# Patient Record
Sex: MALE | Race: WHITE | Employment: FULL TIME | URBAN - NONMETROPOLITAN AREA
[De-identification: names, ages, dates, MRNs, and addresses within clinical notes are randomized per-mention and may not be internally consistent; named-entity substitution may affect disease eponyms.]

---

## 2019-05-13 RX ORDER — ALBUTEROL SULFATE 90 UG/1
2 AEROSOL, METERED RESPIRATORY (INHALATION) EVERY 4 HOURS PRN
Qty: 1 INHALER | Refills: 1 | Status: SHIPPED | OUTPATIENT
Start: 2019-05-13 | End: 2019-06-10 | Stop reason: SDUPTHER

## 2019-05-13 RX ORDER — ALBUTEROL SULFATE 90 UG/1
AEROSOL, METERED RESPIRATORY (INHALATION)
COMMUNITY
Start: 2016-04-23 | End: 2019-05-13 | Stop reason: SDUPTHER

## 2019-06-05 RX ORDER — AMLODIPINE BESYLATE 10 MG/1
10 TABLET ORAL DAILY
COMMUNITY
End: 2019-06-10 | Stop reason: SDUPTHER

## 2019-06-05 RX ORDER — MONTELUKAST SODIUM 10 MG/1
10 TABLET ORAL NIGHTLY
COMMUNITY
End: 2019-06-10 | Stop reason: SDUPTHER

## 2019-06-05 RX ORDER — PREDNISONE 10 MG/1
TABLET ORAL
COMMUNITY
Start: 2017-01-07 | End: 2019-06-08

## 2019-06-05 SDOH — HEALTH STABILITY: MENTAL HEALTH: HOW OFTEN DO YOU HAVE A DRINK CONTAINING ALCOHOL?: NEVER

## 2019-06-10 ENCOUNTER — OFFICE VISIT (OUTPATIENT)
Dept: FAMILY MEDICINE CLINIC | Age: 64
End: 2019-06-10
Payer: COMMERCIAL

## 2019-06-10 VITALS
HEART RATE: 68 BPM | SYSTOLIC BLOOD PRESSURE: 130 MMHG | WEIGHT: 175 LBS | HEIGHT: 67 IN | BODY MASS INDEX: 27.47 KG/M2 | RESPIRATION RATE: 16 BRPM | DIASTOLIC BLOOD PRESSURE: 74 MMHG

## 2019-06-10 DIAGNOSIS — J45.909 UNCOMPLICATED ASTHMA, UNSPECIFIED ASTHMA SEVERITY, UNSPECIFIED WHETHER PERSISTENT: Primary | ICD-10-CM

## 2019-06-10 DIAGNOSIS — I10 ESSENTIAL HYPERTENSION: ICD-10-CM

## 2019-06-10 DIAGNOSIS — E78.5 HYPERLIPIDEMIA, UNSPECIFIED HYPERLIPIDEMIA TYPE: ICD-10-CM

## 2019-06-10 DIAGNOSIS — T78.40XA ALLERGIC STATE, INITIAL ENCOUNTER: ICD-10-CM

## 2019-06-10 DIAGNOSIS — Z12.5 PROSTATE CANCER SCREENING: ICD-10-CM

## 2019-06-10 DIAGNOSIS — Z12.12 ENCOUNTER FOR SCREENING FOR MALIGNANT NEOPLASM OF RECTUM: ICD-10-CM

## 2019-06-10 PROCEDURE — 93000 ELECTROCARDIOGRAM COMPLETE: CPT | Performed by: FAMILY MEDICINE

## 2019-06-10 PROCEDURE — 99214 OFFICE O/P EST MOD 30 MIN: CPT | Performed by: FAMILY MEDICINE

## 2019-06-10 RX ORDER — ALBUTEROL SULFATE 90 UG/1
2 AEROSOL, METERED RESPIRATORY (INHALATION) EVERY 4 HOURS PRN
Qty: 3 INHALER | Refills: 1 | Status: ON HOLD | OUTPATIENT
Start: 2019-06-10 | End: 2019-09-26

## 2019-06-10 RX ORDER — AMLODIPINE BESYLATE 10 MG/1
10 TABLET ORAL DAILY
Qty: 90 TABLET | Refills: 1 | Status: SHIPPED | OUTPATIENT
Start: 2019-06-10 | End: 2019-12-23 | Stop reason: SDUPTHER

## 2019-06-10 RX ORDER — MONTELUKAST SODIUM 10 MG/1
10 TABLET ORAL NIGHTLY
Qty: 90 TABLET | Refills: 1 | Status: SHIPPED | OUTPATIENT
Start: 2019-06-10 | End: 2019-12-13 | Stop reason: SDUPTHER

## 2019-06-10 ASSESSMENT — ENCOUNTER SYMPTOMS
ABDOMINAL PAIN: 0
SHORTNESS OF BREATH: 1
CHEST TIGHTNESS: 0
ALLERGIC/IMMUNOLOGIC NEGATIVE: 1

## 2019-06-10 ASSESSMENT — PATIENT HEALTH QUESTIONNAIRE - PHQ9
SUM OF ALL RESPONSES TO PHQ9 QUESTIONS 1 & 2: 0
SUM OF ALL RESPONSES TO PHQ QUESTIONS 1-9: 0
1. LITTLE INTEREST OR PLEASURE IN DOING THINGS: 0
SUM OF ALL RESPONSES TO PHQ QUESTIONS 1-9: 0
2. FEELING DOWN, DEPRESSED OR HOPELESS: 0

## 2019-06-10 NOTE — PROGRESS NOTES
6/10/19  Adela Parada : 1955 Sex: male  Age: 62 y.o. Chief Complaint   Patient presents with    Hypertension    Asthma       Follow up on hypertension. Breathing has been stable   Patient presents for recheck of hypertension. Denies chest pain, edema, fatigue, palpitations and syncope. Compliance reviewed. No medication side effects noted. Denies associated dyspnea, edema, fatigue, orthopnea, chest pain and palpitations. Review of Systems   Constitutional: Negative for activity change and appetite change. HENT: Negative for congestion. Eyes: Negative for visual disturbance. Respiratory: Positive for shortness of breath. Negative for chest tightness. Cardiovascular: Negative for chest pain, palpitations and leg swelling. Gastrointestinal: Negative for abdominal pain. Endocrine: Negative for cold intolerance and heat intolerance. Genitourinary: Negative for difficulty urinating. Musculoskeletal: Negative for arthralgias. Skin: Negative. Negative for rash. Allergic/Immunologic: Negative. Neurological: Negative for dizziness, syncope and light-headedness. Hematological: Negative. Negative for adenopathy. Psychiatric/Behavioral: Negative.           Current Outpatient Medications:     albuterol sulfate HFA (VENTOLIN HFA) 108 (90 Base) MCG/ACT inhaler, Inhale 2 puffs into the lungs every 4 hours as needed for Wheezing, Disp: 3 Inhaler, Rfl: 1    montelukast (SINGULAIR) 10 MG tablet, Take 1 tablet by mouth nightly, Disp: 90 tablet, Rfl: 1    fluticasone-salmeterol (ADVAIR DISKUS) 500-50 MCG/DOSE diskus inhaler, Inhale 1 puff into the lungs 2 times daily, Disp: 3 Inhaler, Rfl: 1    amLODIPine (NORVASC) 10 MG tablet, Take 1 tablet by mouth daily, Disp: 90 tablet, Rfl: 1  Allergies   Allergen Reactions    Salmeterol     Sulfa Antibiotics        Past Medical History:   Diagnosis Date    Asthma      Past Surgical History:   Procedure Laterality Date    TONSILLECTOMY clear and moist.   Eyes: Pupils are equal, round, and reactive to light. Conjunctivae and EOM are normal.   Neck: Normal range of motion. Neck supple. No thyromegaly present. Cardiovascular: Normal rate, regular rhythm, normal heart sounds and intact distal pulses. Pulmonary/Chest: Effort normal and breath sounds normal.   Abdominal: Soft. Bowel sounds are normal. He exhibits no mass. There is no tenderness. Genitourinary: Rectum normal and prostate normal.   Musculoskeletal: Normal range of motion. Lymphadenopathy:     He has no cervical adenopathy. Neurological: He is alert and oriented to person, place, and time. No cranial nerve deficit. Skin: Skin is warm and dry. No rash noted. Psychiatric: He has a normal mood and affect. Assessment and Plan:  Mary Be was seen today for hypertension and asthma. Diagnoses and all orders for this visit:    Uncomplicated asthma, unspecified asthma severity, unspecified whether persistent  Comments:  breathing has been stable  Orders:  -     albuterol sulfate HFA (VENTOLIN HFA) 108 (90 Base) MCG/ACT inhaler; Inhale 2 puffs into the lungs every 4 hours as needed for Wheezing  -     fluticasone-salmeterol (ADVAIR DISKUS) 500-50 MCG/DOSE diskus inhaler; Inhale 1 puff into the lungs 2 times daily    Allergic state, initial encounter  -     montelukast (SINGULAIR) 10 MG tablet; Take 1 tablet by mouth nightly    Essential hypertension  Comments:  stable   declines ekg   Orders:  -     amLODIPine (NORVASC) 10 MG tablet; Take 1 tablet by mouth daily  -     CBC Auto Differential; Future  -     Comprehensive Metabolic Panel; Future  -     EKG 12 Lead    Encounter for screening for malignant neoplasm of rectum  -     POCT Fit Test; Future    Prostate cancer screening  -     Psa screening; Future    Hyperlipidemia, unspecified hyperlipidemia type  Comments:  due for fasting yearly labs   Orders:  -     Lipid Panel;  Future        Return in about 6 months (around

## 2019-08-16 ENCOUNTER — OFFICE VISIT (OUTPATIENT)
Dept: FAMILY MEDICINE CLINIC | Age: 64
End: 2019-08-16
Payer: COMMERCIAL

## 2019-08-16 VITALS
BODY MASS INDEX: 27.31 KG/M2 | SYSTOLIC BLOOD PRESSURE: 132 MMHG | HEIGHT: 67 IN | WEIGHT: 174 LBS | DIASTOLIC BLOOD PRESSURE: 60 MMHG | HEART RATE: 96 BPM

## 2019-08-16 DIAGNOSIS — R93.89 CHEST X-RAY ABNORMALITY: Primary | ICD-10-CM

## 2019-08-16 DIAGNOSIS — J45.41 MODERATE PERSISTENT ASTHMA WITH ACUTE EXACERBATION: ICD-10-CM

## 2019-08-16 PROBLEM — J45.909 ASTHMA: Status: ACTIVE | Noted: 2019-08-16

## 2019-08-16 PROCEDURE — 99213 OFFICE O/P EST LOW 20 MIN: CPT | Performed by: FAMILY MEDICINE

## 2019-08-16 RX ORDER — AZITHROMYCIN 250 MG/1
250 TABLET, FILM COATED ORAL SEE ADMIN INSTRUCTIONS
Qty: 6 TABLET | Refills: 0 | Status: SHIPPED | OUTPATIENT
Start: 2019-08-16 | End: 2019-08-21

## 2019-08-16 RX ORDER — METHYLPREDNISOLONE 4 MG/1
TABLET ORAL
Qty: 1 KIT | Refills: 0 | Status: SHIPPED | OUTPATIENT
Start: 2019-08-16 | End: 2019-08-22

## 2019-08-16 RX ORDER — PREDNISONE 10 MG/1
1 TABLET ORAL DAILY PRN
COMMUNITY
Start: 2017-01-07 | End: 2019-08-29

## 2019-08-16 NOTE — PROGRESS NOTES
OFFICE NOTE    19  Name: Jacinda Hagen  :1955   Sex:male   Age:64 y.o. SUBJECTIVE  Chief Complaint   Patient presents with   César Linda     started yesterday        HPI comes in reporting pleurisy on right anterolateral chest for about  A week. Denies MICHAELS, but hurts to breath deeply. H/o asthma which is fairly severe, and cough occasioanlly productive, no hemoptysis        Review of Systems   Denies unusual weight loss. Enlarged lymph nodes, neurological symptoms.  No fever or night sweats      Current Outpatient Medications:     predniSONE (DELTASONE) 10 MG tablet, Take 1 tablet by mouth daily as needed, Disp: , Rfl:     methylPREDNISolone (MEDROL DOSEPACK) 4 MG tablet, Take by mouth., Disp: 1 kit, Rfl: 0    azithromycin (ZITHROMAX) 250 MG tablet, Take 1 tablet by mouth See Admin Instructions for 5 days 500mg on day 1 followed by 250mg on days 2 - 5, Disp: 6 tablet, Rfl: 0    albuterol sulfate HFA (VENTOLIN HFA) 108 (90 Base) MCG/ACT inhaler, Inhale 2 puffs into the lungs every 4 hours as needed for Wheezing, Disp: 3 Inhaler, Rfl: 1    montelukast (SINGULAIR) 10 MG tablet, Take 1 tablet by mouth nightly, Disp: 90 tablet, Rfl: 1    fluticasone-salmeterol (ADVAIR DISKUS) 500-50 MCG/DOSE diskus inhaler, Inhale 1 puff into the lungs 2 times daily, Disp: 3 Inhaler, Rfl: 1    amLODIPine (NORVASC) 10 MG tablet, Take 1 tablet by mouth daily, Disp: 90 tablet, Rfl: 1  Allergies   Allergen Reactions    Salmeterol Other (See Comments)     Lung issues     Sulfa Antibiotics Other (See Comments)     Reaction as a child        Past Medical History:   Diagnosis Date    Asthma      Past Surgical History:   Procedure Laterality Date    TONSILLECTOMY AND ADENOIDECTOMY      WRIST SURGERY       Family History   Problem Relation Age of Onset    High Blood Pressure Mother     No Known Problems Father      Social History     Tobacco History     Smoking Status  Never Smoker    Smokeless Tobacco Use  Never

## 2019-08-29 ENCOUNTER — OFFICE VISIT (OUTPATIENT)
Dept: FAMILY MEDICINE CLINIC | Age: 64
End: 2019-08-29
Payer: COMMERCIAL

## 2019-08-29 VITALS
WEIGHT: 174 LBS | SYSTOLIC BLOOD PRESSURE: 100 MMHG | TEMPERATURE: 100.8 F | BODY MASS INDEX: 27.31 KG/M2 | OXYGEN SATURATION: 94 % | HEIGHT: 67 IN | HEART RATE: 102 BPM | DIASTOLIC BLOOD PRESSURE: 56 MMHG | RESPIRATION RATE: 15 BRPM

## 2019-08-29 DIAGNOSIS — L50.0 ALLERGIC URTICARIA: Primary | ICD-10-CM

## 2019-08-29 DIAGNOSIS — J45.20 MILD INTERMITTENT ASTHMA WITHOUT COMPLICATION: ICD-10-CM

## 2019-08-29 PROCEDURE — 96372 THER/PROPH/DIAG INJ SC/IM: CPT | Performed by: FAMILY MEDICINE

## 2019-08-29 PROCEDURE — 99213 OFFICE O/P EST LOW 20 MIN: CPT | Performed by: FAMILY MEDICINE

## 2019-08-29 RX ORDER — PREDNISONE 10 MG/1
TABLET ORAL
Qty: 18 TABLET | Refills: 0 | Status: SHIPPED | OUTPATIENT
Start: 2019-08-29 | End: 2019-09-07

## 2019-08-29 RX ORDER — METHYLPREDNISOLONE ACETATE 40 MG/ML
40 INJECTION, SUSPENSION INTRA-ARTICULAR; INTRALESIONAL; INTRAMUSCULAR; SOFT TISSUE ONCE
Status: COMPLETED | OUTPATIENT
Start: 2019-08-29 | End: 2019-08-29

## 2019-08-29 RX ADMIN — METHYLPREDNISOLONE ACETATE 40 MG: 40 INJECTION, SUSPENSION INTRA-ARTICULAR; INTRALESIONAL; INTRAMUSCULAR; SOFT TISSUE at 09:38

## 2019-08-29 ASSESSMENT — ENCOUNTER SYMPTOMS
SINUS PAIN: 0
WHEEZING: 1
COUGH: 0
DIARRHEA: 0
SORE THROAT: 0
EYE DISCHARGE: 0
COLOR CHANGE: 1
EYE PAIN: 0
SHORTNESS OF BREATH: 0
TROUBLE SWALLOWING: 0
BACK PAIN: 0
VOMITING: 0
EYE REDNESS: 0
ALLERGIC/IMMUNOLOGIC NEGATIVE: 1
NAUSEA: 0
ABDOMINAL PAIN: 0
CHEST TIGHTNESS: 0
BLOOD IN STOOL: 0
PHOTOPHOBIA: 0

## 2019-08-29 NOTE — PROGRESS NOTES
amLODIPine (NORVASC) 10 MG tablet, Take 1 tablet by mouth daily, Disp: 90 tablet, Rfl: 1  Allergies   Allergen Reactions    Salmeterol Other (See Comments)     Lung issues     Sulfa Antibiotics Other (See Comments)     Reaction as a child        Past Medical History:   Diagnosis Date    Asthma      Past Surgical History:   Procedure Laterality Date    TONSILLECTOMY AND ADENOIDECTOMY      WRIST SURGERY       Family History   Problem Relation Age of Onset    High Blood Pressure Mother     No Known Problems Father      Social History     Socioeconomic History    Marital status:      Spouse name: Not on file    Number of children: Not on file    Years of education: Not on file    Highest education level: Not on file   Occupational History    Not on file   Social Needs    Financial resource strain: Not on file    Food insecurity:     Worry: Not on file     Inability: Not on file    Transportation needs:     Medical: Not on file     Non-medical: Not on file   Tobacco Use    Smoking status: Never Smoker    Smokeless tobacco: Never Used   Substance and Sexual Activity    Alcohol use: Never     Frequency: Never    Drug use: Not on file    Sexual activity: Not on file   Lifestyle    Physical activity:     Days per week: Not on file     Minutes per session: Not on file    Stress: Not on file   Relationships    Social connections:     Talks on phone: Not on file     Gets together: Not on file     Attends Alevism service: Not on file     Active member of club or organization: Not on file     Attends meetings of clubs or organizations: Not on file     Relationship status: Not on file    Intimate partner violence:     Fear of current or ex partner: Not on file     Emotionally abused: Not on file     Physically abused: Not on file     Forced sexual activity: Not on file   Other Topics Concern    Not on file   Social History Narrative    Not on file       Vitals:    08/29/19 0917   BP: (!) 100/56

## 2019-08-31 ENCOUNTER — HOSPITAL ENCOUNTER (OUTPATIENT)
Age: 64
Discharge: HOME | End: 2019-09-02
Payer: COMMERCIAL

## 2019-08-31 ENCOUNTER — OFFICE VISIT (OUTPATIENT)
Dept: FAMILY MEDICINE CLINIC | Age: 64
End: 2019-08-31
Payer: COMMERCIAL

## 2019-08-31 VITALS
WEIGHT: 176 LBS | TEMPERATURE: 97.8 F | OXYGEN SATURATION: 98 % | SYSTOLIC BLOOD PRESSURE: 114 MMHG | DIASTOLIC BLOOD PRESSURE: 60 MMHG | BODY MASS INDEX: 27.57 KG/M2 | HEART RATE: 76 BPM

## 2019-08-31 DIAGNOSIS — L50.0 ALLERGIC URTICARIA: Primary | ICD-10-CM

## 2019-08-31 DIAGNOSIS — L50.0 ALLERGIC URTICARIA: ICD-10-CM

## 2019-08-31 LAB
HCT VFR BLD CALC: 41.1 % (ref 37–54)
HEMOGLOBIN: 12.8 G/DL (ref 12.5–16.5)
MCH RBC QN AUTO: 29.2 PG (ref 26–35)
MCHC RBC AUTO-ENTMCNC: 31.1 % (ref 32–34.5)
MCV RBC AUTO: 93.6 FL (ref 80–99.9)
PDW BLD-RTO: 13.3 FL (ref 11.5–15)
PLATELET # BLD: 384 E9/L (ref 130–450)
PMV BLD AUTO: 9.7 FL (ref 7–12)
RBC # BLD: 4.39 E12/L (ref 3.8–5.8)
WBC # BLD: 20.1 E9/L (ref 4.5–11.5)

## 2019-08-31 PROCEDURE — 99213 OFFICE O/P EST LOW 20 MIN: CPT | Performed by: FAMILY MEDICINE

## 2019-08-31 PROCEDURE — 96372 THER/PROPH/DIAG INJ SC/IM: CPT | Performed by: FAMILY MEDICINE

## 2019-08-31 PROCEDURE — 85027 COMPLETE CBC AUTOMATED: CPT

## 2019-08-31 PROCEDURE — 36415 COLL VENOUS BLD VENIPUNCTURE: CPT

## 2019-08-31 RX ORDER — METHYLPREDNISOLONE ACETATE 40 MG/ML
40 INJECTION, SUSPENSION INTRA-ARTICULAR; INTRALESIONAL; INTRAMUSCULAR; SOFT TISSUE ONCE
Status: COMPLETED | OUTPATIENT
Start: 2019-08-31 | End: 2019-08-31

## 2019-08-31 RX ADMIN — METHYLPREDNISOLONE ACETATE 40 MG: 40 INJECTION, SUSPENSION INTRA-ARTICULAR; INTRALESIONAL; INTRAMUSCULAR; SOFT TISSUE at 09:07

## 2019-08-31 ASSESSMENT — ENCOUNTER SYMPTOMS
SINUS PAIN: 0
WHEEZING: 1
COUGH: 0
CHEST TIGHTNESS: 0
ALLERGIC/IMMUNOLOGIC NEGATIVE: 1
COLOR CHANGE: 1
BLOOD IN STOOL: 0
DIARRHEA: 0
VOMITING: 0
NAUSEA: 0
SHORTNESS OF BREATH: 0
TROUBLE SWALLOWING: 0
SORE THROAT: 0
ABDOMINAL PAIN: 0
EYE REDNESS: 0
PHOTOPHOBIA: 0
BACK PAIN: 0
EYE DISCHARGE: 0
EYE PAIN: 0

## 2019-08-31 NOTE — PROGRESS NOTES
19  Urvashi Noland : 1955 Sex: male  Age: 62 y.o. Chief Complaint   Patient presents with    Rash     was seen on thurs for same thing       Rash, hive like, onset Monday, progressive over last 4 days. Intensely pruritic, trunk and extremities. Seen EXP , Depo Medrol IM, prednisone wit hcleqrin rash upper. Legs continue and worsening with redness, pruritus    Rash   Pertinent negatives include no congestion, cough, diarrhea, eye pain, fatigue, shortness of breath, sore throat or vomiting. Review of Systems   Constitutional: Negative for appetite change, fatigue and unexpected weight change. HENT: Negative for congestion, ear pain, hearing loss, sinus pain, sore throat and trouble swallowing. Eyes: Negative for photophobia, pain, discharge and redness. Respiratory: Positive for wheezing. Negative for cough, chest tightness and shortness of breath. Cardiovascular: Negative for chest pain, palpitations and leg swelling. Gastrointestinal: Negative for abdominal pain, blood in stool, diarrhea, nausea and vomiting. Endocrine: Negative. Genitourinary: Negative for dysuria, flank pain, frequency and hematuria. Musculoskeletal: Negative for arthralgias, back pain, joint swelling and myalgias. Skin: Positive for color change and rash. Allergic/Immunologic: Negative. Neurological: Negative for dizziness, seizures, syncope, weakness, light-headedness, numbness and headaches. Hematological: Negative for adenopathy. Does not bruise/bleed easily. Psychiatric/Behavioral: Negative. Current Outpatient Medications:     predniSONE (DELTASONE) 10 MG tablet, Take 3 tablets by mouth daily for 3 days, THEN 2 tablets daily for 3 days, THEN 1 tablet daily for 3 days. , Disp: 18 tablet, Rfl: 0    albuterol sulfate HFA (VENTOLIN HFA) 108 (90 Base) MCG/ACT inhaler, Inhale 2 puffs into the lungs every 4 hours as needed for Wheezing, Disp: 3 Inhaler, Rfl: 1    montelukast Merle Velarde, DO

## 2019-09-20 ENCOUNTER — TELEPHONE (OUTPATIENT)
Dept: FAMILY MEDICINE CLINIC | Age: 64
End: 2019-09-20

## 2019-09-21 ENCOUNTER — HOSPITAL ENCOUNTER (OUTPATIENT)
Dept: CT IMAGING | Age: 64
Discharge: HOME | End: 2019-09-23
Payer: COMMERCIAL

## 2019-09-21 ENCOUNTER — HOSPITAL ENCOUNTER (OUTPATIENT)
Age: 64
Discharge: HOME | End: 2019-09-23
Payer: COMMERCIAL

## 2019-09-21 ENCOUNTER — OFFICE VISIT (OUTPATIENT)
Dept: FAMILY MEDICINE CLINIC | Age: 64
End: 2019-09-21
Payer: COMMERCIAL

## 2019-09-21 VITALS
OXYGEN SATURATION: 95 % | SYSTOLIC BLOOD PRESSURE: 128 MMHG | DIASTOLIC BLOOD PRESSURE: 64 MMHG | WEIGHT: 172.2 LBS | BODY MASS INDEX: 26.97 KG/M2 | TEMPERATURE: 98.4 F | RESPIRATION RATE: 20 BRPM | HEART RATE: 83 BPM

## 2019-09-21 DIAGNOSIS — R91.8 LUNG MASS: ICD-10-CM

## 2019-09-21 DIAGNOSIS — R07.9 CHEST PAIN, UNSPECIFIED TYPE: ICD-10-CM

## 2019-09-21 DIAGNOSIS — R07.9 CHEST PAIN, UNSPECIFIED TYPE: Primary | ICD-10-CM

## 2019-09-21 PROCEDURE — 99214 OFFICE O/P EST MOD 30 MIN: CPT | Performed by: INTERNAL MEDICINE

## 2019-09-21 PROCEDURE — 71250 CT THORAX DX C-: CPT

## 2019-09-21 RX ORDER — PREDNISONE 10 MG/1
TABLET ORAL
Qty: 12 TABLET | Refills: 0 | Status: ON HOLD | OUTPATIENT
Start: 2019-09-21 | End: 2019-09-30 | Stop reason: HOSPADM

## 2019-09-22 ASSESSMENT — ENCOUNTER SYMPTOMS
COUGH: 1
SHORTNESS OF BREATH: 0
SINUS PRESSURE: 0
NAUSEA: 0
WHEEZING: 1
VOMITING: 0
ABDOMINAL PAIN: 0
DIARRHEA: 0

## 2019-09-22 NOTE — PROGRESS NOTES
Inhale 2 puffs into the lungs every 4 hours as needed for Wheezing, Disp: 3 Inhaler, Rfl: 1    montelukast (SINGULAIR) 10 MG tablet, Take 1 tablet by mouth nightly, Disp: 90 tablet, Rfl: 1    fluticasone-salmeterol (ADVAIR DISKUS) 500-50 MCG/DOSE diskus inhaler, Inhale 1 puff into the lungs 2 times daily, Disp: 3 Inhaler, Rfl: 1    amLODIPine (NORVASC) 10 MG tablet, Take 1 tablet by mouth daily, Disp: 90 tablet, Rfl: 1  Allergies   Allergen Reactions    Salmeterol Other (See Comments)     Lung issues     Sulfa Antibiotics Other (See Comments)     Reaction as a child        Past Medical History:   Diagnosis Date    Asthma      Past Surgical History:   Procedure Laterality Date    TONSILLECTOMY AND ADENOIDECTOMY      WRIST SURGERY       Family History   Problem Relation Age of Onset    High Blood Pressure Mother     No Known Problems Father      Social History     Socioeconomic History    Marital status:      Spouse name: Not on file    Number of children: Not on file    Years of education: Not on file    Highest education level: Not on file   Occupational History    Not on file   Social Needs    Financial resource strain: Not on file    Food insecurity:     Worry: Not on file     Inability: Not on file    Transportation needs:     Medical: Not on file     Non-medical: Not on file   Tobacco Use    Smoking status: Never Smoker    Smokeless tobacco: Never Used   Substance and Sexual Activity    Alcohol use: Never     Frequency: Never    Drug use: Not on file    Sexual activity: Not on file   Lifestyle    Physical activity:     Days per week: Not on file     Minutes per session: Not on file    Stress: Not on file   Relationships    Social connections:     Talks on phone: Not on file     Gets together: Not on file     Attends Anglican service: Not on file     Active member of club or organization: Not on file     Attends meetings of clubs or organizations: Not on file     Relationship chest x-ray we will set up for CAT scan of the chest which I told him he must get. We will also get plain film rib x-rays. Follow-up with PCP next week. Taper dose prednisone given for his wheezing from his exacerbated asthma. Notify us with problems in the interim. Return 1 week fu dr [place] Taylor. Seen By:  Immanuel Eaton MD      *Document was created using voice recognition software. Note was reviewed however may contain grammatical errors.

## 2019-09-23 ENCOUNTER — TELEPHONE (OUTPATIENT)
Dept: FAMILY MEDICINE CLINIC | Age: 64
End: 2019-09-23

## 2019-09-23 ENCOUNTER — OFFICE VISIT (OUTPATIENT)
Dept: FAMILY MEDICINE CLINIC | Age: 64
End: 2019-09-23
Payer: COMMERCIAL

## 2019-09-23 VITALS
RESPIRATION RATE: 18 BRPM | TEMPERATURE: 97.8 F | BODY MASS INDEX: 26.94 KG/M2 | DIASTOLIC BLOOD PRESSURE: 66 MMHG | OXYGEN SATURATION: 93 % | HEART RATE: 78 BPM | SYSTOLIC BLOOD PRESSURE: 138 MMHG | WEIGHT: 172 LBS

## 2019-09-23 DIAGNOSIS — R91.8 MASS OF RIGHT LUNG: Primary | ICD-10-CM

## 2019-09-23 DIAGNOSIS — J90 PLEURAL EFFUSION: ICD-10-CM

## 2019-09-23 PROCEDURE — 99214 OFFICE O/P EST MOD 30 MIN: CPT | Performed by: FAMILY MEDICINE

## 2019-09-23 ASSESSMENT — ENCOUNTER SYMPTOMS
BLOOD IN STOOL: 0
COUGH: 0
SHORTNESS OF BREATH: 0
ABDOMINAL PAIN: 0

## 2019-09-23 NOTE — PROGRESS NOTES
19  Jett Dhaliwal : 1955 Sex: male  Age: 62 y.o. Chief Complaint   Patient presents with    Hypertension    Asthma     follow up from urgent care on 19    Hyperlipidemia       SUBJECTIVE    CC:    [facility] - had  abnormal xray and CT scans. Recommend referral to Pulmonology. Right lung mass  cxr says RUL   Ct saysRLUPE. Will schedule to see Dr. Yamile Meza. Was asymptomatic before recent injury --relates that pulmonary evaluated a cxr abnormality years ago. no fever --asthma stable        HPI:  Denies chest pain, edema, fatigue, palpitations and syncope. Compliance reviewed. No medication side effects noted. Review of Systems   Constitutional: Negative for appetite change, fatigue and unexpected weight change. HENT: Negative for congestion. Eyes: Negative for visual disturbance. Respiratory: Negative for cough and shortness of breath. Cardiovascular: Negative for chest pain, palpitations and leg swelling. Gastrointestinal: Negative for abdominal pain and blood in stool. Endocrine: Negative for cold intolerance, heat intolerance, polydipsia and polyuria. Genitourinary: Negative for difficulty urinating. Musculoskeletal: Negative for arthralgias and myalgias. Skin: Negative for rash. Neurological: Negative for dizziness, syncope, weakness and numbness. Hematological: Negative for adenopathy. Current Outpatient Medications:     predniSONE (DELTASONE) 10 MG tablet, Take 3 tablets by mouth daily for 2 days, THEN 2 tablets daily for 2 days, THEN 1 tablet daily for 2 days. , Disp: 12 tablet, Rfl: 0    albuterol sulfate HFA (VENTOLIN HFA) 108 (90 Base) MCG/ACT inhaler, Inhale 2 puffs into the lungs every 4 hours as needed for Wheezing, Disp: 3 Inhaler, Rfl: 1    montelukast (SINGULAIR) 10 MG tablet, Take 1 tablet by mouth nightly, Disp: 90 tablet, Rfl: 1    fluticasone-salmeterol (ADVAIR DISKUS) 500-50 MCG/DOSE diskus inhaler, Inhale

## 2019-09-24 ENCOUNTER — PREP FOR PROCEDURE (OUTPATIENT)
Dept: CARDIOTHORACIC SURGERY | Age: 64
End: 2019-09-24

## 2019-09-24 ENCOUNTER — INITIAL CONSULT (OUTPATIENT)
Dept: CARDIOTHORACIC SURGERY | Age: 64
End: 2019-09-24
Payer: COMMERCIAL

## 2019-09-24 VITALS
WEIGHT: 171 LBS | HEIGHT: 66 IN | SYSTOLIC BLOOD PRESSURE: 138 MMHG | DIASTOLIC BLOOD PRESSURE: 78 MMHG | BODY MASS INDEX: 27.48 KG/M2 | HEART RATE: 98 BPM

## 2019-09-24 DIAGNOSIS — Z01.818 PRE-OP TESTING: ICD-10-CM

## 2019-09-24 DIAGNOSIS — J90 PLEURAL EFFUSION ON RIGHT: Primary | ICD-10-CM

## 2019-09-24 DIAGNOSIS — J90 RECURRENT RIGHT PLEURAL EFFUSION: Primary | ICD-10-CM

## 2019-09-24 PROCEDURE — 99203 OFFICE O/P NEW LOW 30 MIN: CPT | Performed by: NURSE PRACTITIONER

## 2019-09-24 RX ORDER — SODIUM CHLORIDE 0.9 % (FLUSH) 0.9 %
10 SYRINGE (ML) INJECTION EVERY 12 HOURS SCHEDULED
Status: CANCELLED | OUTPATIENT
Start: 2019-09-24

## 2019-09-24 RX ORDER — SODIUM CHLORIDE 0.9 % (FLUSH) 0.9 %
10 SYRINGE (ML) INJECTION PRN
Status: CANCELLED | OUTPATIENT
Start: 2019-09-24

## 2019-09-24 RX ORDER — SODIUM CHLORIDE 9 MG/ML
INJECTION, SOLUTION INTRAVENOUS CONTINUOUS
Status: CANCELLED | OUTPATIENT
Start: 2019-09-24

## 2019-09-24 RX ORDER — CHLORHEXIDINE GLUCONATE 4 G/100ML
SOLUTION TOPICAL ONCE
Status: CANCELLED | OUTPATIENT
Start: 2019-09-24 | End: 2019-09-24

## 2019-09-24 RX ORDER — CHLORHEXIDINE GLUCONATE 0.12 MG/ML
15 RINSE ORAL ONCE
Status: CANCELLED | OUTPATIENT
Start: 2019-09-24 | End: 2019-09-24

## 2019-09-24 ASSESSMENT — ENCOUNTER SYMPTOMS
ABDOMINAL PAIN: 0
COLOR CHANGE: 0
SHORTNESS OF BREATH: 1
CHEST TIGHTNESS: 0

## 2019-09-24 NOTE — LETTER
Height: 5' 6\" (1.676 m)        Subjective:      Patient ID: Graciela Flowers is a 62 y.o. male. Chief Complaint   Patient presents with   [facility] Consultation     Referral Dr Isabel Hernandez     HPI  Mr. Savi Beach is a 71-year-old male presenting to office to discuss possible surgical intervention. His PMH is significant for asthma. About 7 weeks ago, while fixing a load in his truck, he was struck in the right lower chest.  Since that time, he had some trouble breathing. Recent outpatient chest CT revealed a large loculated right-sided pleural effusion with associated atelectasis and lymphadenopathy. Currently, the patient complains of pain with a deep inspiration and also night sweats. There have been no chills. The patient is a lifelong non-smoker. Review of Systems   Constitutional: Negative for activity change, chills, fatigue, fever and unexpected weight change. Respiratory: Positive for shortness of breath. Negative for chest tightness. Cardiovascular: Negative for chest pain and palpitations. Gastrointestinal: Negative for abdominal pain. Skin: Negative for color change. Neurological: Negative for dizziness, syncope and light-headedness. Objective:   Physical Exam   Constitutional: He is oriented to person, place, and time. He appears well-developed. No distress. Neck: Normal range of motion. Cardiovascular: Normal rate. Pulmonary/Chest: Effort normal. No respiratory distress. Abdominal: Soft. Bowel sounds are normal.   Musculoskeletal: Normal range of motion. Neurological: He is alert and oriented to person, place, and time. Skin: Skin is warm and dry. Psychiatric: He has a normal mood and affect. Assessment:      Loculated effusion S/P chest trauma      Plan:      Likely a 9week old hemothorax. Going into his chest will be no small task but it certainly needs to be done.   PAT tomorrow and OR Thursday at 2pm.  I told him that he has a higher than normal likelihood that I won't be

## 2019-09-25 ENCOUNTER — HOSPITAL ENCOUNTER (OUTPATIENT)
Dept: PREADMISSION TESTING | Age: 64
Discharge: HOME | DRG: 163 | End: 2019-09-25
Payer: COMMERCIAL

## 2019-09-25 ENCOUNTER — ANESTHESIA EVENT (OUTPATIENT)
Dept: OPERATING ROOM | Age: 64
DRG: 163 | End: 2019-09-25
Payer: COMMERCIAL

## 2019-09-25 VITALS
SYSTOLIC BLOOD PRESSURE: 125 MMHG | HEART RATE: 100 BPM | OXYGEN SATURATION: 94 % | HEIGHT: 66 IN | TEMPERATURE: 98.6 F | WEIGHT: 171 LBS | BODY MASS INDEX: 27.48 KG/M2 | RESPIRATION RATE: 16 BRPM | DIASTOLIC BLOOD PRESSURE: 65 MMHG

## 2019-09-25 DIAGNOSIS — Z01.818 PRE-OP TESTING: ICD-10-CM

## 2019-09-25 DIAGNOSIS — J90 RECURRENT RIGHT PLEURAL EFFUSION: ICD-10-CM

## 2019-09-25 LAB
ABO/RH: NORMAL
ALBUMIN SERPL-MCNC: 3.2 G/DL (ref 3.5–5.2)
ALP BLD-CCNC: 136 U/L (ref 40–129)
ALT SERPL-CCNC: 61 U/L (ref 0–40)
ANION GAP SERPL CALCULATED.3IONS-SCNC: 8 MMOL/L (ref 7–16)
ANTIBODY SCREEN: NORMAL
APTT: 26.5 SEC (ref 24.5–35.1)
AST SERPL-CCNC: 34 U/L (ref 0–39)
BILIRUB SERPL-MCNC: 0.4 MG/DL (ref 0–1.2)
BUN BLDV-MCNC: 29 MG/DL (ref 8–23)
CALCIUM SERPL-MCNC: 10.1 MG/DL (ref 8.6–10.2)
CHLORIDE BLD-SCNC: 99 MMOL/L (ref 98–107)
CO2: 31 MMOL/L (ref 22–29)
CREAT SERPL-MCNC: 1.2 MG/DL (ref 0.7–1.2)
GFR AFRICAN AMERICAN: >60
GFR NON-AFRICAN AMERICAN: >60 ML/MIN/1.73
GLUCOSE BLD-MCNC: 98 MG/DL (ref 74–99)
HCT VFR BLD CALC: 35.3 % (ref 37–54)
HEMOGLOBIN: 11 G/DL (ref 12.5–16.5)
INR BLD: 1.3
MCH RBC QN AUTO: 28 PG (ref 26–35)
MCHC RBC AUTO-ENTMCNC: 31.2 % (ref 32–34.5)
MCV RBC AUTO: 89.8 FL (ref 80–99.9)
PDW BLD-RTO: 14.3 FL (ref 11.5–15)
PLATELET # BLD: 538 E9/L (ref 130–450)
PMV BLD AUTO: 9 FL (ref 7–12)
POTASSIUM REFLEX MAGNESIUM: 3.9 MMOL/L (ref 3.5–5)
PROTHROMBIN TIME: 14.2 SEC (ref 9.3–12.4)
RBC # BLD: 3.93 E12/L (ref 3.8–5.8)
SODIUM BLD-SCNC: 138 MMOL/L (ref 132–146)
TOTAL PROTEIN: 8.9 G/DL (ref 6.4–8.3)
WBC # BLD: 20 E9/L (ref 4.5–11.5)

## 2019-09-25 PROCEDURE — 86900 BLOOD TYPING SEROLOGIC ABO: CPT

## 2019-09-25 PROCEDURE — 85027 COMPLETE CBC AUTOMATED: CPT

## 2019-09-25 PROCEDURE — 36415 COLL VENOUS BLD VENIPUNCTURE: CPT

## 2019-09-25 PROCEDURE — 86923 COMPATIBILITY TEST ELECTRIC: CPT

## 2019-09-25 PROCEDURE — 86901 BLOOD TYPING SEROLOGIC RH(D): CPT

## 2019-09-25 PROCEDURE — 85730 THROMBOPLASTIN TIME PARTIAL: CPT

## 2019-09-25 PROCEDURE — 86850 RBC ANTIBODY SCREEN: CPT

## 2019-09-25 PROCEDURE — 85610 PROTHROMBIN TIME: CPT

## 2019-09-25 PROCEDURE — 87081 CULTURE SCREEN ONLY: CPT

## 2019-09-25 PROCEDURE — 80053 COMPREHEN METABOLIC PANEL: CPT

## 2019-09-25 NOTE — ANESTHESIA PRE PROCEDURE
Department of Anesthesiology  Preprocedure Note       Name:  Erin Benites   Age:  62 y.o.  :  1955                                          MRN:  [id number]         Date:  2019      Surgeon: Marilu Hong):  Petra Collado MD    Procedure: [facility], RIGHT VIDEO ASSISTED  THORACOSCOPY,  DECORTICATION, POSSIBLE THORACOTOMY (Right )    Medications prior to admission:   Prior to Admission medications    Medication Sig Start Date End Date Taking? Authorizing Provider   Albuterol Sulfate (PROAIR HFA IN) Inhale into the lungs   Yes Historical Provider, MD   Multiple Vitamins-Minerals (MULTIVITAMIN ADULT PO) Take by mouth STOP PREOP MED   Yes Historical Provider, MD   predniSONE (DELTASONE) 10 MG tablet Take 3 tablets by mouth daily for 2 days, THEN 2 tablets daily for 2 days, THEN 1 tablet daily for 2 days. 19 Yes Mo Askew MD   montelukast (SINGULAIR) 10 MG tablet Take 1 tablet by mouth nightly 6/10/19  Yes Jacki Dominguez DO   fluticasone-salmeterol (ADVAIR DISKUS) 500-50 MCG/DOSE diskus inhaler Inhale 1 puff into the lungs 2 times daily 6/10/19  Yes Sridhar Trent DO   amLODIPine (NORVASC) 10 MG tablet Take 1 tablet by mouth daily 6/10/19  Yes Jacki Dominguez DO       Current medications:    Current Facility-Administered Medications   Medication Dose Route Frequency Provider Last Rate Last Dose    0.9 % sodium chloride infusion   Intravenous Continuous KALEB Arteaga  mL/hr at 19 0810      ceFAZolin (ANCEF) 2 g in dextrose 3 % 50 mL IVPB (duplex)  2 g Intravenous On Call to [address], APRN - CNP        chlorhexidine (HIBICLENS) 4 % liquid   Topical Once KALEB Arteaga CNP        sodium chloride flush 0.9 % injection 10 mL  10 mL Intravenous 2 times per day KALEB Arteaga CNP        sodium chloride flush 0.9 % injection 10 mL  10 mL Intravenous PRN KALEB Arteaga CNP           Allergies:     Allergies   Allergen Reactions    Salmeterol Other (See Comments)     Lung issues   On advair ?  Sulfa Antibiotics Other (See Comments)     Reaction as a child        Problem List:    Patient Active Problem List   Diagnosis Code    Asthma J45.909    Pleural effusion on right J90       Past Medical History:        Diagnosis Date    Asthma     dr Ellis Del Valle Chest trauma 2019    work accident    Hypertension        Past Surgical History:        Procedure Laterality Date    TONSILLECTOMY AND ADENOIDECTOMY      WRIST SURGERY Right 1996    plate       Social History:    Social History     Tobacco Use    Smoking status: Never Smoker    Smokeless tobacco: Never Used   Substance Use Topics    Alcohol use: Never     Frequency: Never                                Counseling given: Not Answered      Vital Signs (Current):   Vitals:    09/26/19 0808   BP: 133/73   Pulse: 88   Resp: 16   Temp: 98.9 °F (37.2 °C)   TempSrc: Temporal   SpO2: 95%   Weight: 171 lb (77.6 kg)   Height: 5' 6\" (1.676 m)                                              BP Readings from Last 3 Encounters:   09/26/19 133/73   09/25/19 125/65   09/24/19 138/78       NPO Status: Time of last liquid consumption: 0630                        Time of last solid consumption: 2000                        Date of last liquid consumption: 09/26/19                        Date of last solid food consumption: 09/25/19    BMI:   Wt Readings from Last 3 Encounters:   09/26/19 171 lb (77.6 kg)   09/25/19 171 lb (77.6 kg)   09/24/19 171 lb (77.6 kg)     Body mass index is 27.6 kg/m².     CBC:   Lab Results   Component Value Date    WBC 20.0 09/25/2019    RBC 3.93 09/25/2019    HGB 11.0 09/25/2019    HCT 35.3 09/25/2019    MCV 89.8 09/25/2019    RDW 14.3 09/25/2019     09/25/2019       CMP:   Lab Results   Component Value Date     09/25/2019    K 3.9 09/25/2019    CL 99 09/25/2019    CO2 31 09/25/2019    BUN 29 09/25/2019    CREATININE 1.2 09/25/2019    GFRAA >60 09/25/2019    LABGLOM >60 09/25/2019    GLUCOSE 98 09/25/2019    PROT 8.9 09/25/2019    CALCIUM 10.1 09/25/2019    BILITOT 0.4 09/25/2019    ALKPHOS 136 09/25/2019    AST 34 09/25/2019    ALT 61 09/25/2019       POC Tests: No results for input(s): POCGLU, POCNA, POCK, POCCL, POCBUN, POCHEMO, POCHCT in the last 72 hours. Coags:   Lab Results   Component Value Date    PROTIME 14.2 09/25/2019    INR 1.3 09/25/2019    APTT 26.5 09/25/2019       HCG (If Applicable): No results found for: PREGTESTUR, PREGSERUM, HCG, HCGQUANT     ABGs: No results found for: PHART, PO2ART, AMM5CEW, KWJ7QZM, BEART, Y7DRKPXH     Type & Screen (If Applicable):  No results found for: LABABO, [address]    Anesthesia Evaluation  Patient summary reviewed and Nursing notes reviewed no history of anesthetic complications:   Airway: Mallampati: III  TM distance: >3 FB   Neck ROM: full  Mouth opening: > = 3 FB Dental: normal exam         Pulmonary:   (+) decreased breath sounds,  asthma:                            Cardiovascular:    (+) hypertension:,         Rhythm: regular  Rate: normal           Beta Blocker:  Not on Beta Blocker         Neuro/Psych:   Negative Neuro/Psych ROS              GI/Hepatic/Renal: Neg GI/Hepatic/Renal ROS            Endo/Other: Negative Endo/Other ROS                    Abdominal:           Vascular: negative vascular ROS. Anesthesia Plan      general and epidural     ASA 3       Induction: intravenous. arterial line  MIPS: Postoperative opioids intended, Prophylactic antiemetics administered, Postoperative trial extubation and One-lung ventilation. Anesthetic plan and risks discussed with patient. Use of blood products discussed with whom did not consent to blood products. DOS STAFF ADDENDUM:    Patient seen and chart reviewed. Physical exam and history updated as indicated. NPO status confirmed.   Anesthesia options and plan discussed including risks benefits with patient/legal guardian and family as available. Concerns and questions addressed. Consent verbalized to proceed.   Anesthesia plan, options and intraoperative/postoperative concerns discussed with care team.    Denys Thomas MD  9/26/2019  8:51 Eligio Quiles MD   9/26/2019

## 2019-09-26 ENCOUNTER — APPOINTMENT (OUTPATIENT)
Dept: GENERAL RADIOLOGY | Age: 64
DRG: 163 | End: 2019-09-26
Attending: THORACIC SURGERY (CARDIOTHORACIC VASCULAR SURGERY)
Payer: COMMERCIAL

## 2019-09-26 ENCOUNTER — HOSPITAL ENCOUNTER (INPATIENT)
Age: 64
LOS: 4 days | Discharge: HOME HEALTH | DRG: 163 | End: 2019-09-30
Attending: THORACIC SURGERY (CARDIOTHORACIC VASCULAR SURGERY) | Admitting: THORACIC SURGERY (CARDIOTHORACIC VASCULAR SURGERY)
Payer: COMMERCIAL

## 2019-09-26 ENCOUNTER — ANESTHESIA (OUTPATIENT)
Dept: OPERATING ROOM | Age: 64
DRG: 163 | End: 2019-09-26
Payer: COMMERCIAL

## 2019-09-26 VITALS
RESPIRATION RATE: 27 BRPM | OXYGEN SATURATION: 99 % | DIASTOLIC BLOOD PRESSURE: 80 MMHG | SYSTOLIC BLOOD PRESSURE: 132 MMHG | TEMPERATURE: 97.5 F

## 2019-09-26 DIAGNOSIS — G89.18 ACUTE POST-OPERATIVE PAIN: ICD-10-CM

## 2019-09-26 DIAGNOSIS — J90 PLEURAL EFFUSION ON RIGHT: ICD-10-CM

## 2019-09-26 DIAGNOSIS — Z01.818 PRE-OP TESTING: Primary | ICD-10-CM

## 2019-09-26 DIAGNOSIS — J94.2 HEMOTHORAX: ICD-10-CM

## 2019-09-26 LAB
ANION GAP SERPL CALCULATED.3IONS-SCNC: 9 MMOL/L (ref 7–16)
BLOOD BANK DISPENSE STATUS: NORMAL
BLOOD BANK DISPENSE STATUS: NORMAL
BLOOD BANK PRODUCT CODE: NORMAL
BLOOD BANK PRODUCT CODE: NORMAL
BPU ID: NORMAL
BPU ID: NORMAL
BUN BLDV-MCNC: 30 MG/DL (ref 8–23)
CALCIUM SERPL-MCNC: 9 MG/DL (ref 8.6–10.2)
CHLORIDE BLD-SCNC: 102 MMOL/L (ref 98–107)
CO2: 27 MMOL/L (ref 22–29)
CREAT SERPL-MCNC: 1.1 MG/DL (ref 0.7–1.2)
DESCRIPTION BLOOD BANK: NORMAL
DESCRIPTION BLOOD BANK: NORMAL
GFR AFRICAN AMERICAN: >60
GFR NON-AFRICAN AMERICAN: >60 ML/MIN/1.73
GLUCOSE BLD-MCNC: 134 MG/DL (ref 74–99)
HCT VFR BLD CALC: 34.1 % (ref 37–54)
HEMOGLOBIN: 10.6 G/DL (ref 12.5–16.5)
MAGNESIUM: 2 MG/DL (ref 1.6–2.6)
MCH RBC QN AUTO: 28.2 PG (ref 26–35)
MCHC RBC AUTO-ENTMCNC: 31.1 % (ref 32–34.5)
MCV RBC AUTO: 90.7 FL (ref 80–99.9)
METER GLUCOSE: 94 MG/DL (ref 74–99)
ORGANISM: ABNORMAL
PDW BLD-RTO: 14.3 FL (ref 11.5–15)
PLATELET # BLD: 474 E9/L (ref 130–450)
PMV BLD AUTO: 8.5 FL (ref 7–12)
POTASSIUM SERPL-SCNC: 4 MMOL/L (ref 3.5–5)
RBC # BLD: 3.76 E12/L (ref 3.8–5.8)
SODIUM BLD-SCNC: 138 MMOL/L (ref 132–146)
WBC # BLD: 24.4 E9/L (ref 4.5–11.5)

## 2019-09-26 PROCEDURE — 87205 SMEAR GRAM STAIN: CPT

## 2019-09-26 PROCEDURE — 97110 THERAPEUTIC EXERCISES: CPT | Performed by: PHYSICAL THERAPIST

## 2019-09-26 PROCEDURE — 0BJ08ZZ INSPECTION OF TRACHEOBRONCHIAL TREE, VIA NATURAL OR ARTIFICIAL OPENING ENDOSCOPIC: ICD-10-PCS | Performed by: THORACIC SURGERY (CARDIOTHORACIC VASCULAR SURGERY)

## 2019-09-26 PROCEDURE — 3600000014 HC SURGERY LEVEL 4 ADDTL 15MIN: Performed by: THORACIC SURGERY (CARDIOTHORACIC VASCULAR SURGERY)

## 2019-09-26 PROCEDURE — 87102 FUNGUS ISOLATION CULTURE: CPT

## 2019-09-26 PROCEDURE — 6360000002 HC RX W HCPCS: Performed by: NURSE PRACTITIONER

## 2019-09-26 PROCEDURE — 87070 CULTURE OTHR SPECIMN AEROBIC: CPT

## 2019-09-26 PROCEDURE — 2709999900 HC NON-CHARGEABLE SUPPLY: Performed by: THORACIC SURGERY (CARDIOTHORACIC VASCULAR SURGERY)

## 2019-09-26 PROCEDURE — 7100000001 HC PACU RECOVERY - ADDTL 15 MIN: Performed by: THORACIC SURGERY (CARDIOTHORACIC VASCULAR SURGERY)

## 2019-09-26 PROCEDURE — 94664 DEMO&/EVAL PT USE INHALER: CPT

## 2019-09-26 PROCEDURE — 97161 PT EVAL LOW COMPLEX 20 MIN: CPT | Performed by: PHYSICAL THERAPIST

## 2019-09-26 PROCEDURE — 87186 SC STD MICRODIL/AGAR DIL: CPT

## 2019-09-26 PROCEDURE — 32652 THORACOSCOPY REM TOTL CORTEX: CPT | Performed by: THORACIC SURGERY (CARDIOTHORACIC VASCULAR SURGERY)

## 2019-09-26 PROCEDURE — 2500000003 HC RX 250 WO HCPCS

## 2019-09-26 PROCEDURE — 85027 COMPLETE CBC AUTOMATED: CPT

## 2019-09-26 PROCEDURE — 6370000000 HC RX 637 (ALT 250 FOR IP): Performed by: NURSE PRACTITIONER

## 2019-09-26 PROCEDURE — 2580000003 HC RX 258: Performed by: NURSE PRACTITIONER

## 2019-09-26 PROCEDURE — 71045 X-RAY EXAM CHEST 1 VIEW: CPT

## 2019-09-26 PROCEDURE — 7100000000 HC PACU RECOVERY - FIRST 15 MIN: Performed by: THORACIC SURGERY (CARDIOTHORACIC VASCULAR SURGERY)

## 2019-09-26 PROCEDURE — 82962 GLUCOSE BLOOD TEST: CPT

## 2019-09-26 PROCEDURE — 6360000002 HC RX W HCPCS: Performed by: ANESTHESIOLOGY

## 2019-09-26 PROCEDURE — 2140000000 HC CCU INTERMEDIATE R&B

## 2019-09-26 PROCEDURE — 87176 TISSUE HOMOGENIZATION CULTR: CPT

## 2019-09-26 PROCEDURE — 2500000003 HC RX 250 WO HCPCS: Performed by: NURSE ANESTHETIST, CERTIFIED REGISTERED

## 2019-09-26 PROCEDURE — 80048 BASIC METABOLIC PNL TOTAL CA: CPT

## 2019-09-26 PROCEDURE — 3600000004 HC SURGERY LEVEL 4 BASE: Performed by: THORACIC SURGERY (CARDIOTHORACIC VASCULAR SURGERY)

## 2019-09-26 PROCEDURE — 3700000001 HC ADD 15 MINUTES (ANESTHESIA): Performed by: THORACIC SURGERY (CARDIOTHORACIC VASCULAR SURGERY)

## 2019-09-26 PROCEDURE — 87075 CULTR BACTERIA EXCEPT BLOOD: CPT

## 2019-09-26 PROCEDURE — 6360000002 HC RX W HCPCS: Performed by: INTERNAL MEDICINE

## 2019-09-26 PROCEDURE — 94640 AIRWAY INHALATION TREATMENT: CPT

## 2019-09-26 PROCEDURE — 3700000000 HC ANESTHESIA ATTENDED CARE: Performed by: THORACIC SURGERY (CARDIOTHORACIC VASCULAR SURGERY)

## 2019-09-26 PROCEDURE — 0BNM4ZZ RELEASE BILATERAL LUNGS, PERCUTANEOUS ENDOSCOPIC APPROACH: ICD-10-PCS | Performed by: THORACIC SURGERY (CARDIOTHORACIC VASCULAR SURGERY)

## 2019-09-26 PROCEDURE — 6360000002 HC RX W HCPCS: Performed by: NURSE ANESTHETIST, CERTIFIED REGISTERED

## 2019-09-26 PROCEDURE — 6360000002 HC RX W HCPCS

## 2019-09-26 PROCEDURE — 36415 COLL VENOUS BLD VENIPUNCTURE: CPT

## 2019-09-26 PROCEDURE — 2500000003 HC RX 250 WO HCPCS: Performed by: THORACIC SURGERY (CARDIOTHORACIC VASCULAR SURGERY)

## 2019-09-26 PROCEDURE — 83735 ASSAY OF MAGNESIUM: CPT

## 2019-09-26 PROCEDURE — 2700000000 HC OXYGEN THERAPY PER DAY

## 2019-09-26 PROCEDURE — 97530 THERAPEUTIC ACTIVITIES: CPT | Performed by: PHYSICAL THERAPIST

## 2019-09-26 RX ORDER — CHLORHEXIDINE GLUCONATE 0.12 MG/ML
15 RINSE ORAL ONCE
Status: COMPLETED | OUTPATIENT
Start: 2019-09-26 | End: 2019-09-26

## 2019-09-26 RX ORDER — SODIUM CHLORIDE 9 MG/ML
INJECTION, SOLUTION INTRAVENOUS CONTINUOUS
Status: DISCONTINUED | OUTPATIENT
Start: 2019-09-26 | End: 2019-09-26

## 2019-09-26 RX ORDER — AMLODIPINE BESYLATE 10 MG/1
10 TABLET ORAL DAILY
Status: DISCONTINUED | OUTPATIENT
Start: 2019-09-27 | End: 2019-09-30 | Stop reason: HOSPADM

## 2019-09-26 RX ORDER — VECURONIUM BROMIDE 1 MG/ML
INJECTION, POWDER, LYOPHILIZED, FOR SOLUTION INTRAVENOUS PRN
Status: DISCONTINUED | OUTPATIENT
Start: 2019-09-26 | End: 2019-09-26 | Stop reason: SDUPTHER

## 2019-09-26 RX ORDER — MEPERIDINE HYDROCHLORIDE 50 MG/ML
12.5 INJECTION INTRAMUSCULAR; INTRAVENOUS; SUBCUTANEOUS EVERY 5 MIN PRN
Status: DISCONTINUED | OUTPATIENT
Start: 2019-09-26 | End: 2019-09-26 | Stop reason: HOSPADM

## 2019-09-26 RX ORDER — SODIUM CHLORIDE 0.9 % (FLUSH) 0.9 %
10 SYRINGE (ML) INJECTION EVERY 12 HOURS SCHEDULED
Status: DISCONTINUED | OUTPATIENT
Start: 2019-09-26 | End: 2019-09-26 | Stop reason: HOSPADM

## 2019-09-26 RX ORDER — FORMOTEROL FUMARATE 20 UG/2ML
20 SOLUTION RESPIRATORY (INHALATION) 2 TIMES DAILY
Status: DISCONTINUED | OUTPATIENT
Start: 2019-09-26 | End: 2019-09-30 | Stop reason: HOSPADM

## 2019-09-26 RX ORDER — MORPHINE SULFATE 2 MG/ML
2 INJECTION, SOLUTION INTRAMUSCULAR; INTRAVENOUS EVERY 4 HOURS PRN
Status: DISCONTINUED | OUTPATIENT
Start: 2019-09-26 | End: 2019-09-30 | Stop reason: HOSPADM

## 2019-09-26 RX ORDER — LABETALOL HYDROCHLORIDE 5 MG/ML
5 INJECTION, SOLUTION INTRAVENOUS EVERY 10 MIN PRN
Status: DISCONTINUED | OUTPATIENT
Start: 2019-09-26 | End: 2019-09-26 | Stop reason: HOSPADM

## 2019-09-26 RX ORDER — PROPOFOL 10 MG/ML
INJECTION, EMULSION INTRAVENOUS PRN
Status: DISCONTINUED | OUTPATIENT
Start: 2019-09-26 | End: 2019-09-26 | Stop reason: SDUPTHER

## 2019-09-26 RX ORDER — MIDAZOLAM HYDROCHLORIDE 1 MG/ML
INJECTION INTRAMUSCULAR; INTRAVENOUS PRN
Status: DISCONTINUED | OUTPATIENT
Start: 2019-09-26 | End: 2019-09-26 | Stop reason: SDUPTHER

## 2019-09-26 RX ORDER — ONDANSETRON 2 MG/ML
INJECTION INTRAMUSCULAR; INTRAVENOUS PRN
Status: DISCONTINUED | OUTPATIENT
Start: 2019-09-26 | End: 2019-09-26 | Stop reason: SDUPTHER

## 2019-09-26 RX ORDER — PROMETHAZINE HYDROCHLORIDE 25 MG/ML
6.25 INJECTION, SOLUTION INTRAMUSCULAR; INTRAVENOUS
Status: DISCONTINUED | OUTPATIENT
Start: 2019-09-26 | End: 2019-09-26 | Stop reason: HOSPADM

## 2019-09-26 RX ORDER — OXYCODONE HYDROCHLORIDE 5 MG/1
5 TABLET ORAL EVERY 4 HOURS PRN
Status: DISCONTINUED | OUTPATIENT
Start: 2019-09-26 | End: 2019-09-30 | Stop reason: HOSPADM

## 2019-09-26 RX ORDER — MONTELUKAST SODIUM 10 MG/1
10 TABLET ORAL NIGHTLY
Status: DISCONTINUED | OUTPATIENT
Start: 2019-09-26 | End: 2019-09-30 | Stop reason: HOSPADM

## 2019-09-26 RX ORDER — GLYCOPYRROLATE 1 MG/5 ML
SYRINGE (ML) INTRAVENOUS PRN
Status: DISCONTINUED | OUTPATIENT
Start: 2019-09-26 | End: 2019-09-26 | Stop reason: SDUPTHER

## 2019-09-26 RX ORDER — 0.9 % SODIUM CHLORIDE 0.9 %
250 INTRAVENOUS SOLUTION INTRAVENOUS ONCE
Status: DISCONTINUED | OUTPATIENT
Start: 2019-09-26 | End: 2019-09-26

## 2019-09-26 RX ORDER — CEFAZOLIN SODIUM 2 G/50ML
2 SOLUTION INTRAVENOUS EVERY 8 HOURS
Status: COMPLETED | OUTPATIENT
Start: 2019-09-26 | End: 2019-09-27

## 2019-09-26 RX ORDER — SODIUM CHLORIDE 0.9 % (FLUSH) 0.9 %
10 SYRINGE (ML) INJECTION PRN
Status: DISCONTINUED | OUTPATIENT
Start: 2019-09-26 | End: 2019-09-26 | Stop reason: HOSPADM

## 2019-09-26 RX ORDER — DOCUSATE SODIUM 100 MG/1
100 CAPSULE, LIQUID FILLED ORAL 2 TIMES DAILY
Status: DISCONTINUED | OUTPATIENT
Start: 2019-09-26 | End: 2019-09-30 | Stop reason: HOSPADM

## 2019-09-26 RX ORDER — ACETAMINOPHEN 325 MG/1
650 TABLET ORAL EVERY 6 HOURS
Status: COMPLETED | OUTPATIENT
Start: 2019-09-26 | End: 2019-09-28

## 2019-09-26 RX ORDER — BUPIVACAINE HYDROCHLORIDE AND EPINEPHRINE 5; 5 MG/ML; UG/ML
INJECTION, SOLUTION EPIDURAL; INTRACAUDAL; PERINEURAL PRN
Status: DISCONTINUED | OUTPATIENT
Start: 2019-09-26 | End: 2019-09-26 | Stop reason: ALTCHOICE

## 2019-09-26 RX ORDER — CEFAZOLIN SODIUM 2 G/50ML
2 SOLUTION INTRAVENOUS
Status: COMPLETED | OUTPATIENT
Start: 2019-09-26 | End: 2019-09-26

## 2019-09-26 RX ORDER — BUDESONIDE 0.5 MG/2ML
0.5 INHALANT ORAL 2 TIMES DAILY
Status: DISCONTINUED | OUTPATIENT
Start: 2019-09-26 | End: 2019-09-30 | Stop reason: HOSPADM

## 2019-09-26 RX ORDER — LIDOCAINE HYDROCHLORIDE 20 MG/ML
INJECTION, SOLUTION INTRAVENOUS PRN
Status: DISCONTINUED | OUTPATIENT
Start: 2019-09-26 | End: 2019-09-26 | Stop reason: SDUPTHER

## 2019-09-26 RX ORDER — SODIUM CHLORIDE 0.9 % (FLUSH) 0.9 %
10 SYRINGE (ML) INJECTION EVERY 12 HOURS SCHEDULED
Status: DISCONTINUED | OUTPATIENT
Start: 2019-09-26 | End: 2019-09-30 | Stop reason: HOSPADM

## 2019-09-26 RX ORDER — IPRATROPIUM BROMIDE AND ALBUTEROL SULFATE 2.5; .5 MG/3ML; MG/3ML
1 SOLUTION RESPIRATORY (INHALATION)
Status: DISCONTINUED | OUTPATIENT
Start: 2019-09-26 | End: 2019-09-30 | Stop reason: HOSPADM

## 2019-09-26 RX ORDER — FENTANYL CITRATE 50 UG/ML
INJECTION, SOLUTION INTRAMUSCULAR; INTRAVENOUS PRN
Status: DISCONTINUED | OUTPATIENT
Start: 2019-09-26 | End: 2019-09-26 | Stop reason: SDUPTHER

## 2019-09-26 RX ORDER — HYDRALAZINE HYDROCHLORIDE 20 MG/ML
5 INJECTION INTRAMUSCULAR; INTRAVENOUS EVERY 10 MIN PRN
Status: DISCONTINUED | OUTPATIENT
Start: 2019-09-26 | End: 2019-09-26 | Stop reason: HOSPADM

## 2019-09-26 RX ORDER — NEOSTIGMINE METHYLSULFATE 1 MG/ML
INJECTION, SOLUTION INTRAVENOUS PRN
Status: DISCONTINUED | OUTPATIENT
Start: 2019-09-26 | End: 2019-09-26 | Stop reason: SDUPTHER

## 2019-09-26 RX ORDER — CHLORHEXIDINE GLUCONATE 4 G/100ML
SOLUTION TOPICAL ONCE
Status: DISCONTINUED | OUTPATIENT
Start: 2019-09-26 | End: 2019-09-26 | Stop reason: HOSPADM

## 2019-09-26 RX ORDER — DEXAMETHASONE SODIUM PHOSPHATE 10 MG/ML
INJECTION INTRAMUSCULAR; INTRAVENOUS PRN
Status: DISCONTINUED | OUTPATIENT
Start: 2019-09-26 | End: 2019-09-26 | Stop reason: SDUPTHER

## 2019-09-26 RX ORDER — SENNA PLUS 8.6 MG/1
1 TABLET ORAL NIGHTLY
Status: DISCONTINUED | OUTPATIENT
Start: 2019-09-27 | End: 2019-09-30 | Stop reason: HOSPADM

## 2019-09-26 RX ORDER — ONDANSETRON 2 MG/ML
4 INJECTION INTRAMUSCULAR; INTRAVENOUS EVERY 6 HOURS PRN
Status: DISCONTINUED | OUTPATIENT
Start: 2019-09-26 | End: 2019-09-30 | Stop reason: HOSPADM

## 2019-09-26 RX ORDER — SODIUM CHLORIDE 0.9 % (FLUSH) 0.9 %
10 SYRINGE (ML) INJECTION PRN
Status: DISCONTINUED | OUTPATIENT
Start: 2019-09-26 | End: 2019-09-30 | Stop reason: HOSPADM

## 2019-09-26 RX ADMIN — ACETAMINOPHEN 650 MG: 325 TABLET, FILM COATED ORAL at 16:36

## 2019-09-26 RX ADMIN — SODIUM CHLORIDE: 9 INJECTION, SOLUTION INTRAVENOUS at 08:10

## 2019-09-26 RX ADMIN — CEFAZOLIN SODIUM 2 G: 2 SOLUTION INTRAVENOUS at 09:39

## 2019-09-26 RX ADMIN — OXYCODONE HYDROCHLORIDE 5 MG: 5 TABLET ORAL at 16:37

## 2019-09-26 RX ADMIN — PROPOFOL 200 MG: 10 INJECTION, EMULSION INTRAVENOUS at 09:42

## 2019-09-26 RX ADMIN — Medication 10 ML: at 21:04

## 2019-09-26 RX ADMIN — MUPIROCIN: 20 OINTMENT TOPICAL at 21:04

## 2019-09-26 RX ADMIN — MIDAZOLAM HYDROCHLORIDE 2 MG: 1 INJECTION, SOLUTION INTRAMUSCULAR; INTRAVENOUS at 09:33

## 2019-09-26 RX ADMIN — FENTANYL CITRATE 25 MCG: 50 INJECTION, SOLUTION INTRAMUSCULAR; INTRAVENOUS at 10:40

## 2019-09-26 RX ADMIN — DEXAMETHASONE SODIUM PHOSPHATE 10 MG: 10 INJECTION INTRAMUSCULAR; INTRAVENOUS at 09:53

## 2019-09-26 RX ADMIN — LIDOCAINE HYDROCHLORIDE 100 MG: 20 INJECTION, SOLUTION INTRAVENOUS at 09:42

## 2019-09-26 RX ADMIN — CEFAZOLIN SODIUM 2 G: 2 SOLUTION INTRAVENOUS at 16:37

## 2019-09-26 RX ADMIN — BUDESONIDE 500 MCG: 0.5 SUSPENSION RESPIRATORY (INHALATION) at 20:26

## 2019-09-26 RX ADMIN — IPRATROPIUM BROMIDE AND ALBUTEROL SULFATE 1 AMPULE: .5; 3 SOLUTION RESPIRATORY (INHALATION) at 20:25

## 2019-09-26 RX ADMIN — Medication 10 ML: at 16:37

## 2019-09-26 RX ADMIN — CHLORHEXIDINE GLUCONATE 0.12% ORAL RINSE 15 ML: 1.2 LIQUID ORAL at 08:29

## 2019-09-26 RX ADMIN — OXYCODONE HYDROCHLORIDE 5 MG: 5 TABLET ORAL at 22:06

## 2019-09-26 RX ADMIN — ONDANSETRON HYDROCHLORIDE 4 MG: 2 INJECTION, SOLUTION INTRAMUSCULAR; INTRAVENOUS at 10:18

## 2019-09-26 RX ADMIN — MONTELUKAST SODIUM 10 MG: 10 TABLET ORAL at 21:04

## 2019-09-26 RX ADMIN — Medication 3 MG: at 10:31

## 2019-09-26 RX ADMIN — HYDROMORPHONE HYDROCHLORIDE 0.5 MG: 1 INJECTION, SOLUTION INTRAMUSCULAR; INTRAVENOUS; SUBCUTANEOUS at 11:50

## 2019-09-26 RX ADMIN — IPRATROPIUM BROMIDE AND ALBUTEROL SULFATE 1 AMPULE: .5; 3 SOLUTION RESPIRATORY (INHALATION) at 16:32

## 2019-09-26 RX ADMIN — IPRATROPIUM BROMIDE AND ALBUTEROL SULFATE 1 AMPULE: .5; 3 SOLUTION RESPIRATORY (INHALATION) at 12:00

## 2019-09-26 RX ADMIN — HYDROMORPHONE HYDROCHLORIDE 0.5 MG: 1 INJECTION, SOLUTION INTRAMUSCULAR; INTRAVENOUS; SUBCUTANEOUS at 11:25

## 2019-09-26 RX ADMIN — DOCUSATE SODIUM 100 MG: 100 CAPSULE, LIQUID FILLED ORAL at 21:04

## 2019-09-26 RX ADMIN — FENTANYL CITRATE 25 MCG: 50 INJECTION, SOLUTION INTRAMUSCULAR; INTRAVENOUS at 10:56

## 2019-09-26 RX ADMIN — FENTANYL CITRATE 50 MCG: 50 INJECTION, SOLUTION INTRAMUSCULAR; INTRAVENOUS at 10:54

## 2019-09-26 RX ADMIN — VECURONIUM BROMIDE FOR INJECTION 6 MG: 1 INJECTION, POWDER, LYOPHILIZED, FOR SOLUTION INTRAVENOUS at 09:42

## 2019-09-26 RX ADMIN — FORMOTEROL FUMARATE DIHYDRATE 20 MCG: 20 SOLUTION RESPIRATORY (INHALATION) at 20:26

## 2019-09-26 RX ADMIN — HYDROMORPHONE HYDROCHLORIDE 0.5 MG: 1 INJECTION, SOLUTION INTRAMUSCULAR; INTRAVENOUS; SUBCUTANEOUS at 11:32

## 2019-09-26 RX ADMIN — ACETAMINOPHEN 650 MG: 325 TABLET, FILM COATED ORAL at 22:06

## 2019-09-26 RX ADMIN — Medication 0.6 MG: at 10:31

## 2019-09-26 RX ADMIN — FENTANYL CITRATE 150 MCG: 50 INJECTION, SOLUTION INTRAMUSCULAR; INTRAVENOUS at 09:42

## 2019-09-26 ASSESSMENT — PULMONARY FUNCTION TESTS
PIF_VALUE: 23
PIF_VALUE: 28
PIF_VALUE: 28
PIF_VALUE: 1
PIF_VALUE: 28
PIF_VALUE: 23
PIF_VALUE: 28
PIF_VALUE: 30
PIF_VALUE: 28
PIF_VALUE: 29
PIF_VALUE: 20
PIF_VALUE: 29
PIF_VALUE: 30
PIF_VALUE: 23
PIF_VALUE: 20
PIF_VALUE: 27
PIF_VALUE: 28
PIF_VALUE: 16
PIF_VALUE: 28
PIF_VALUE: 24
PIF_VALUE: 28
PIF_VALUE: 27
PIF_VALUE: 1
PIF_VALUE: 1
PIF_VALUE: 23
PIF_VALUE: 28
PIF_VALUE: 29
PIF_VALUE: 2
PIF_VALUE: 27
PIF_VALUE: 21
PIF_VALUE: 3
PIF_VALUE: 3
PIF_VALUE: 24
PIF_VALUE: 28
PIF_VALUE: 27
PIF_VALUE: 14
PIF_VALUE: 27
PIF_VALUE: 24
PIF_VALUE: 3
PIF_VALUE: 29
PIF_VALUE: 33
PIF_VALUE: 3
PIF_VALUE: 23
PIF_VALUE: 21
PIF_VALUE: 27
PIF_VALUE: 28
PIF_VALUE: 3
PIF_VALUE: 5
PIF_VALUE: 2
PIF_VALUE: 28
PIF_VALUE: 14
PIF_VALUE: 22
PIF_VALUE: 0
PIF_VALUE: 3
PIF_VALUE: 1
PIF_VALUE: 28
PIF_VALUE: 29
PIF_VALUE: 16
PIF_VALUE: 21
PIF_VALUE: 10
PIF_VALUE: 26
PIF_VALUE: 28
PIF_VALUE: 2
PIF_VALUE: 28
PIF_VALUE: 1
PIF_VALUE: 28
PIF_VALUE: 4
PIF_VALUE: 3
PIF_VALUE: 23
PIF_VALUE: 25
PIF_VALUE: 13
PIF_VALUE: 30
PIF_VALUE: 6
PIF_VALUE: 25
PIF_VALUE: 3

## 2019-09-26 ASSESSMENT — PAIN DESCRIPTION - LOCATION
LOCATION: CHEST
LOCATION: CHEST;RIB CAGE
LOCATION: CHEST

## 2019-09-26 ASSESSMENT — PAIN SCALES - GENERAL
PAINLEVEL_OUTOF10: 2
PAINLEVEL_OUTOF10: 0
PAINLEVEL_OUTOF10: 7
PAINLEVEL_OUTOF10: 8
PAINLEVEL_OUTOF10: 7
PAINLEVEL_OUTOF10: 8
PAINLEVEL_OUTOF10: 2
PAINLEVEL_OUTOF10: 0
PAINLEVEL_OUTOF10: 7
PAINLEVEL_OUTOF10: 5
PAINLEVEL_OUTOF10: 0

## 2019-09-26 ASSESSMENT — PAIN DESCRIPTION - FREQUENCY
FREQUENCY: CONTINUOUS

## 2019-09-26 ASSESSMENT — PAIN - FUNCTIONAL ASSESSMENT
PAIN_FUNCTIONAL_ASSESSMENT: PREVENTS OR INTERFERES SOME ACTIVE ACTIVITIES AND ADLS
PAIN_FUNCTIONAL_ASSESSMENT: 0-10
PAIN_FUNCTIONAL_ASSESSMENT: PREVENTS OR INTERFERES SOME ACTIVE ACTIVITIES AND ADLS
PAIN_FUNCTIONAL_ASSESSMENT: PREVENTS OR INTERFERES WITH ALL ACTIVE AND SOME PASSIVE ACTIVITIES

## 2019-09-26 ASSESSMENT — PAIN DESCRIPTION - ORIENTATION
ORIENTATION: RIGHT

## 2019-09-26 ASSESSMENT — PAIN DESCRIPTION - DESCRIPTORS
DESCRIPTORS: ACHING;BURNING;CONSTANT
DESCRIPTORS: ACHING;BURNING
DESCRIPTORS: DULL;DISCOMFORT
DESCRIPTORS: ACHING;BURNING;CONSTANT
DESCRIPTORS: ACHING;BURNING;CONSTANT

## 2019-09-26 ASSESSMENT — PAIN DESCRIPTION - PROGRESSION
CLINICAL_PROGRESSION: GRADUALLY IMPROVING
CLINICAL_PROGRESSION: NOT CHANGED
CLINICAL_PROGRESSION: GRADUALLY IMPROVING
CLINICAL_PROGRESSION: GRADUALLY IMPROVING

## 2019-09-26 ASSESSMENT — PAIN DESCRIPTION - PAIN TYPE
TYPE: SURGICAL PAIN

## 2019-09-26 NOTE — ANESTHESIA PROCEDURE NOTES
Arterial Line:    An arterial line was placed using surface landmarks, in the OR for the following indication(s): continuous blood pressure monitoring and blood sampling needed. A 20 gauge (size), 1 and 3/4 inch (length), Arrow (type) catheter was placed, into the left radial artery, secured by tape and Tegaderm.   Anesthesia type: Local  Local infiltration: Injection  Anesthesiologist: Дмитрий Rodriguez MD  Resident/CRNA: KALEB Gallegos - STEPHANIE  Other anesthesia staff: Jamil Hilton RN  Performed: Resident/CRNA   Preanesthetic Checklist  Completed: patient identified, site marked, surgical consent, pre-op evaluation, timeout performed, IV checked, risks and benefits discussed, monitors and equipment checked, anesthesia consent given, oxygen available and patient being monitored

## 2019-09-27 LAB
ANION GAP SERPL CALCULATED.3IONS-SCNC: 5 MMOL/L (ref 7–16)
BUN BLDV-MCNC: 36 MG/DL (ref 8–23)
CALCIUM SERPL-MCNC: 9.5 MG/DL (ref 8.6–10.2)
CHLORIDE BLD-SCNC: 100 MMOL/L (ref 98–107)
CO2: 31 MMOL/L (ref 22–29)
CREAT SERPL-MCNC: 1.3 MG/DL (ref 0.7–1.2)
GFR AFRICAN AMERICAN: >60
GFR NON-AFRICAN AMERICAN: 56 ML/MIN/1.73
GLUCOSE BLD-MCNC: 167 MG/DL (ref 74–99)
GRAM STAIN ORDERABLE: NORMAL
GRAM STAIN ORDERABLE: NORMAL
HCT VFR BLD CALC: 33 % (ref 37–54)
HEMOGLOBIN: 10.1 G/DL (ref 12.5–16.5)
MCH RBC QN AUTO: 28.7 PG (ref 26–35)
MCHC RBC AUTO-ENTMCNC: 30.6 % (ref 32–34.5)
MCV RBC AUTO: 93.8 FL (ref 80–99.9)
PDW BLD-RTO: 14.2 FL (ref 11.5–15)
PLATELET # BLD: 431 E9/L (ref 130–450)
PMV BLD AUTO: 8.8 FL (ref 7–12)
POTASSIUM SERPL-SCNC: 5.4 MMOL/L (ref 3.5–5)
RBC # BLD: 3.52 E12/L (ref 3.8–5.8)
SODIUM BLD-SCNC: 136 MMOL/L (ref 132–146)
WBC # BLD: 26.3 E9/L (ref 4.5–11.5)

## 2019-09-27 PROCEDURE — 6370000000 HC RX 637 (ALT 250 FOR IP): Performed by: NURSE PRACTITIONER

## 2019-09-27 PROCEDURE — 94760 N-INVAS EAR/PLS OXIMETRY 1: CPT

## 2019-09-27 PROCEDURE — 2580000003 HC RX 258: Performed by: NURSE PRACTITIONER

## 2019-09-27 PROCEDURE — 6360000002 HC RX W HCPCS: Performed by: INTERNAL MEDICINE

## 2019-09-27 PROCEDURE — 6360000002 HC RX W HCPCS: Performed by: NURSE PRACTITIONER

## 2019-09-27 PROCEDURE — 6370000000 HC RX 637 (ALT 250 FOR IP): Performed by: SPECIALIST

## 2019-09-27 PROCEDURE — 80048 BASIC METABOLIC PNL TOTAL CA: CPT

## 2019-09-27 PROCEDURE — 6360000002 HC RX W HCPCS: Performed by: SPECIALIST

## 2019-09-27 PROCEDURE — 02HV33Z INSERTION OF INFUSION DEVICE INTO SUPERIOR VENA CAVA, PERCUTANEOUS APPROACH: ICD-10-PCS | Performed by: SPECIALIST

## 2019-09-27 PROCEDURE — 2140000000 HC CCU INTERMEDIATE R&B

## 2019-09-27 PROCEDURE — 36415 COLL VENOUS BLD VENIPUNCTURE: CPT

## 2019-09-27 PROCEDURE — 2700000000 HC OXYGEN THERAPY PER DAY

## 2019-09-27 PROCEDURE — 87040 BLOOD CULTURE FOR BACTERIA: CPT

## 2019-09-27 PROCEDURE — 85027 COMPLETE CBC AUTOMATED: CPT

## 2019-09-27 PROCEDURE — 2580000003 HC RX 258: Performed by: SPECIALIST

## 2019-09-27 PROCEDURE — 94640 AIRWAY INHALATION TREATMENT: CPT

## 2019-09-27 RX ORDER — SODIUM CHLORIDE 9 MG/ML
INJECTION, SOLUTION INTRAVENOUS CONTINUOUS
Status: DISCONTINUED | OUTPATIENT
Start: 2019-09-27 | End: 2019-09-30 | Stop reason: HOSPADM

## 2019-09-27 RX ORDER — SODIUM CHLORIDE 0.9 % (FLUSH) 0.9 %
10 SYRINGE (ML) INJECTION EVERY 12 HOURS SCHEDULED
Status: DISCONTINUED | OUTPATIENT
Start: 2019-09-27 | End: 2019-09-30 | Stop reason: HOSPADM

## 2019-09-27 RX ORDER — LIDOCAINE HYDROCHLORIDE 10 MG/ML
5 INJECTION, SOLUTION EPIDURAL; INFILTRATION; INTRACAUDAL; PERINEURAL ONCE
Status: DISCONTINUED | OUTPATIENT
Start: 2019-09-27 | End: 2019-09-30 | Stop reason: HOSPADM

## 2019-09-27 RX ORDER — SODIUM CHLORIDE 0.9 % (FLUSH) 0.9 %
10 SYRINGE (ML) INJECTION PRN
Status: DISCONTINUED | OUTPATIENT
Start: 2019-09-27 | End: 2019-09-30 | Stop reason: HOSPADM

## 2019-09-27 RX ADMIN — BUDESONIDE 500 MCG: 0.5 SUSPENSION RESPIRATORY (INHALATION) at 20:54

## 2019-09-27 RX ADMIN — MONTELUKAST SODIUM 10 MG: 10 TABLET ORAL at 20:21

## 2019-09-27 RX ADMIN — AMPICILLIN SODIUM AND SULBACTAM SODIUM 3 G: 2; 1 INJECTION, POWDER, FOR SOLUTION INTRAMUSCULAR; INTRAVENOUS at 19:27

## 2019-09-27 RX ADMIN — MUPIROCIN: 20 OINTMENT TOPICAL at 11:46

## 2019-09-27 RX ADMIN — CEFAZOLIN SODIUM 2 G: 2 SOLUTION INTRAVENOUS at 02:18

## 2019-09-27 RX ADMIN — Medication 10 ML: at 11:47

## 2019-09-27 RX ADMIN — FORMOTEROL FUMARATE DIHYDRATE 20 MCG: 20 SOLUTION RESPIRATORY (INHALATION) at 20:54

## 2019-09-27 RX ADMIN — OXYCODONE HYDROCHLORIDE 5 MG: 5 TABLET ORAL at 17:42

## 2019-09-27 RX ADMIN — DOCUSATE SODIUM 100 MG: 100 CAPSULE, LIQUID FILLED ORAL at 20:21

## 2019-09-27 RX ADMIN — ENOXAPARIN SODIUM 40 MG: 40 INJECTION SUBCUTANEOUS at 08:43

## 2019-09-27 RX ADMIN — AMPICILLIN SODIUM AND SULBACTAM SODIUM 3 G: 2; 1 INJECTION, POWDER, FOR SOLUTION INTRAMUSCULAR; INTRAVENOUS at 13:14

## 2019-09-27 RX ADMIN — SODIUM CHLORIDE: 9 INJECTION, SOLUTION INTRAVENOUS at 23:19

## 2019-09-27 RX ADMIN — IPRATROPIUM BROMIDE AND ALBUTEROL SULFATE 1 AMPULE: .5; 3 SOLUTION RESPIRATORY (INHALATION) at 16:12

## 2019-09-27 RX ADMIN — MUPIROCIN: 20 OINTMENT TOPICAL at 20:22

## 2019-09-27 RX ADMIN — Medication 10 ML: at 02:18

## 2019-09-27 RX ADMIN — SODIUM CHLORIDE: 9 INJECTION, SOLUTION INTRAVENOUS at 12:56

## 2019-09-27 RX ADMIN — AMLODIPINE BESYLATE 10 MG: 10 TABLET ORAL at 11:45

## 2019-09-27 RX ADMIN — SENNOSIDES 8.6 MG: 8.6 TABLET, FILM COATED ORAL at 20:21

## 2019-09-27 RX ADMIN — IPRATROPIUM BROMIDE AND ALBUTEROL SULFATE 1 AMPULE: .5; 3 SOLUTION RESPIRATORY (INHALATION) at 20:54

## 2019-09-27 RX ADMIN — VANCOMYCIN HYDROCHLORIDE 2000 MG: 10 INJECTION, POWDER, LYOPHILIZED, FOR SOLUTION INTRAVENOUS at 13:55

## 2019-09-27 RX ADMIN — IPRATROPIUM BROMIDE AND ALBUTEROL SULFATE 1 AMPULE: .5; 3 SOLUTION RESPIRATORY (INHALATION) at 13:13

## 2019-09-27 RX ADMIN — Medication 10 ML: at 20:22

## 2019-09-27 RX ADMIN — ACETAMINOPHEN 650 MG: 325 TABLET, FILM COATED ORAL at 06:14

## 2019-09-27 RX ADMIN — IPRATROPIUM BROMIDE AND ALBUTEROL SULFATE 1 AMPULE: .5; 3 SOLUTION RESPIRATORY (INHALATION) at 09:58

## 2019-09-27 RX ADMIN — OXYCODONE HYDROCHLORIDE 5 MG: 5 TABLET ORAL at 21:48

## 2019-09-27 RX ADMIN — DOCUSATE SODIUM 100 MG: 100 CAPSULE, LIQUID FILLED ORAL at 08:43

## 2019-09-27 RX ADMIN — BUDESONIDE 500 MCG: 0.5 SUSPENSION RESPIRATORY (INHALATION) at 09:57

## 2019-09-27 RX ADMIN — OXYCODONE HYDROCHLORIDE 5 MG: 5 TABLET ORAL at 11:45

## 2019-09-27 RX ADMIN — ACETAMINOPHEN 650 MG: 325 TABLET, FILM COATED ORAL at 17:21

## 2019-09-27 RX ADMIN — ACETAMINOPHEN 650 MG: 325 TABLET, FILM COATED ORAL at 23:13

## 2019-09-27 RX ADMIN — FORMOTEROL FUMARATE DIHYDRATE 20 MCG: 20 SOLUTION RESPIRATORY (INHALATION) at 09:58

## 2019-09-27 RX ADMIN — Medication 10 ML: at 02:23

## 2019-09-27 RX ADMIN — ACETAMINOPHEN 650 MG: 325 TABLET, FILM COATED ORAL at 11:01

## 2019-09-27 ASSESSMENT — PAIN SCALES - GENERAL
PAINLEVEL_OUTOF10: 0
PAINLEVEL_OUTOF10: 7
PAINLEVEL_OUTOF10: 4
PAINLEVEL_OUTOF10: 0
PAINLEVEL_OUTOF10: 3
PAINLEVEL_OUTOF10: 10
PAINLEVEL_OUTOF10: 6
PAINLEVEL_OUTOF10: 6
PAINLEVEL_OUTOF10: 3

## 2019-09-27 ASSESSMENT — PAIN DESCRIPTION - PAIN TYPE
TYPE: SURGICAL PAIN

## 2019-09-27 ASSESSMENT — PAIN DESCRIPTION - ORIENTATION: ORIENTATION: RIGHT

## 2019-09-27 ASSESSMENT — PAIN DESCRIPTION - LOCATION
LOCATION: BACK
LOCATION: INCISION;CHEST

## 2019-09-27 ASSESSMENT — PAIN DESCRIPTION - DESCRIPTORS: DESCRIPTORS: SORE;ACHING;DISCOMFORT

## 2019-09-28 LAB
ANAEROBIC CULTURE: NORMAL
ANAEROBIC CULTURE: NORMAL
ANION GAP SERPL CALCULATED.3IONS-SCNC: 10 MMOL/L (ref 7–16)
BODY FLUID CULTURE, STERILE: ABNORMAL
BUN BLDV-MCNC: 30 MG/DL (ref 8–23)
CALCIUM SERPL-MCNC: 8.9 MG/DL (ref 8.6–10.2)
CHLORIDE BLD-SCNC: 105 MMOL/L (ref 98–107)
CO2: 26 MMOL/L (ref 22–29)
CREAT SERPL-MCNC: 1.2 MG/DL (ref 0.7–1.2)
CULTURE SURGICAL: ABNORMAL
GFR AFRICAN AMERICAN: >60
GFR NON-AFRICAN AMERICAN: >60 ML/MIN/1.73
GLUCOSE BLD-MCNC: 78 MG/DL (ref 74–99)
GRAM STAIN RESULT: ABNORMAL
GRAM STAIN RESULT: ABNORMAL
HCT VFR BLD CALC: 32.4 % (ref 37–54)
HEMOGLOBIN: 9.7 G/DL (ref 12.5–16.5)
MCH RBC QN AUTO: 28 PG (ref 26–35)
MCHC RBC AUTO-ENTMCNC: 29.9 % (ref 32–34.5)
MCV RBC AUTO: 93.6 FL (ref 80–99.9)
ORGANISM: ABNORMAL
ORGANISM: ABNORMAL
PDW BLD-RTO: 14.6 FL (ref 11.5–15)
PLATELET # BLD: 425 E9/L (ref 130–450)
PMV BLD AUTO: 8.7 FL (ref 7–12)
POTASSIUM SERPL-SCNC: 5.1 MMOL/L (ref 3.5–5)
RBC # BLD: 3.46 E12/L (ref 3.8–5.8)
SODIUM BLD-SCNC: 141 MMOL/L (ref 132–146)
WBC # BLD: 19.5 E9/L (ref 4.5–11.5)

## 2019-09-28 PROCEDURE — 6360000002 HC RX W HCPCS: Performed by: SPECIALIST

## 2019-09-28 PROCEDURE — 6370000000 HC RX 637 (ALT 250 FOR IP): Performed by: NURSE PRACTITIONER

## 2019-09-28 PROCEDURE — 6360000002 HC RX W HCPCS: Performed by: NURSE PRACTITIONER

## 2019-09-28 PROCEDURE — 2140000000 HC CCU INTERMEDIATE R&B

## 2019-09-28 PROCEDURE — 36415 COLL VENOUS BLD VENIPUNCTURE: CPT

## 2019-09-28 PROCEDURE — 2580000003 HC RX 258: Performed by: SPECIALIST

## 2019-09-28 PROCEDURE — 2700000000 HC OXYGEN THERAPY PER DAY

## 2019-09-28 PROCEDURE — 80048 BASIC METABOLIC PNL TOTAL CA: CPT

## 2019-09-28 PROCEDURE — 94640 AIRWAY INHALATION TREATMENT: CPT

## 2019-09-28 PROCEDURE — 6360000002 HC RX W HCPCS: Performed by: INTERNAL MEDICINE

## 2019-09-28 PROCEDURE — 85027 COMPLETE CBC AUTOMATED: CPT

## 2019-09-28 RX ADMIN — IPRATROPIUM BROMIDE AND ALBUTEROL SULFATE 1 AMPULE: .5; 3 SOLUTION RESPIRATORY (INHALATION) at 14:31

## 2019-09-28 RX ADMIN — MUPIROCIN: 20 OINTMENT TOPICAL at 20:42

## 2019-09-28 RX ADMIN — AMPICILLIN SODIUM AND SULBACTAM SODIUM 3 G: 2; 1 INJECTION, POWDER, FOR SOLUTION INTRAMUSCULAR; INTRAVENOUS at 13:18

## 2019-09-28 RX ADMIN — DOCUSATE SODIUM 100 MG: 100 CAPSULE, LIQUID FILLED ORAL at 20:33

## 2019-09-28 RX ADMIN — ACETAMINOPHEN 650 MG: 325 TABLET, FILM COATED ORAL at 11:13

## 2019-09-28 RX ADMIN — IPRATROPIUM BROMIDE AND ALBUTEROL SULFATE 1 AMPULE: .5; 3 SOLUTION RESPIRATORY (INHALATION) at 20:48

## 2019-09-28 RX ADMIN — AMPICILLIN SODIUM AND SULBACTAM SODIUM 3 G: 2; 1 INJECTION, POWDER, FOR SOLUTION INTRAMUSCULAR; INTRAVENOUS at 06:14

## 2019-09-28 RX ADMIN — AMLODIPINE BESYLATE 10 MG: 10 TABLET ORAL at 08:41

## 2019-09-28 RX ADMIN — ENOXAPARIN SODIUM 40 MG: 40 INJECTION SUBCUTANEOUS at 08:41

## 2019-09-28 RX ADMIN — FORMOTEROL FUMARATE DIHYDRATE 20 MCG: 20 SOLUTION RESPIRATORY (INHALATION) at 20:49

## 2019-09-28 RX ADMIN — OXYCODONE HYDROCHLORIDE 5 MG: 5 TABLET ORAL at 13:22

## 2019-09-28 RX ADMIN — VANCOMYCIN HYDROCHLORIDE 1.5 G: 10 INJECTION, POWDER, LYOPHILIZED, FOR SOLUTION INTRAVENOUS at 14:03

## 2019-09-28 RX ADMIN — SENNOSIDES 8.6 MG: 8.6 TABLET, FILM COATED ORAL at 20:33

## 2019-09-28 RX ADMIN — IPRATROPIUM BROMIDE AND ALBUTEROL SULFATE 1 AMPULE: .5; 3 SOLUTION RESPIRATORY (INHALATION) at 08:18

## 2019-09-28 RX ADMIN — ACETAMINOPHEN 650 MG: 325 TABLET, FILM COATED ORAL at 06:13

## 2019-09-28 RX ADMIN — OXYCODONE HYDROCHLORIDE 5 MG: 5 TABLET ORAL at 20:33

## 2019-09-28 RX ADMIN — MUPIROCIN: 20 OINTMENT TOPICAL at 08:42

## 2019-09-28 RX ADMIN — AMPICILLIN SODIUM AND SULBACTAM SODIUM 3 G: 2; 1 INJECTION, POWDER, FOR SOLUTION INTRAMUSCULAR; INTRAVENOUS at 01:30

## 2019-09-28 RX ADMIN — Medication 10 ML: at 01:42

## 2019-09-28 RX ADMIN — BUDESONIDE 500 MCG: 0.5 SUSPENSION RESPIRATORY (INHALATION) at 20:49

## 2019-09-28 RX ADMIN — BUDESONIDE 500 MCG: 0.5 SUSPENSION RESPIRATORY (INHALATION) at 08:18

## 2019-09-28 RX ADMIN — MONTELUKAST SODIUM 10 MG: 10 TABLET ORAL at 20:33

## 2019-09-28 RX ADMIN — OXYCODONE HYDROCHLORIDE 5 MG: 5 TABLET ORAL at 02:08

## 2019-09-28 RX ADMIN — DOCUSATE SODIUM 100 MG: 100 CAPSULE, LIQUID FILLED ORAL at 08:41

## 2019-09-28 RX ADMIN — OXYCODONE HYDROCHLORIDE 5 MG: 5 TABLET ORAL at 08:41

## 2019-09-28 RX ADMIN — Medication 10 ML: at 08:41

## 2019-09-28 RX ADMIN — IPRATROPIUM BROMIDE AND ALBUTEROL SULFATE 1 AMPULE: .5; 3 SOLUTION RESPIRATORY (INHALATION) at 16:58

## 2019-09-28 RX ADMIN — Medication 10 ML: at 06:32

## 2019-09-28 RX ADMIN — FORMOTEROL FUMARATE DIHYDRATE 20 MCG: 20 SOLUTION RESPIRATORY (INHALATION) at 08:18

## 2019-09-28 ASSESSMENT — PAIN DESCRIPTION - LOCATION: LOCATION: RIB CAGE

## 2019-09-28 ASSESSMENT — PAIN DESCRIPTION - DESCRIPTORS: DESCRIPTORS: ACHING;DISCOMFORT;SORE

## 2019-09-28 ASSESSMENT — PAIN SCALES - GENERAL
PAINLEVEL_OUTOF10: 7
PAINLEVEL_OUTOF10: 7
PAINLEVEL_OUTOF10: 3
PAINLEVEL_OUTOF10: 7
PAINLEVEL_OUTOF10: 0
PAINLEVEL_OUTOF10: 5
PAINLEVEL_OUTOF10: 7
PAINLEVEL_OUTOF10: 9

## 2019-09-28 ASSESSMENT — PAIN DESCRIPTION - PROGRESSION: CLINICAL_PROGRESSION: GRADUALLY IMPROVING

## 2019-09-28 ASSESSMENT — PAIN DESCRIPTION - PAIN TYPE: TYPE: ACUTE PAIN;SURGICAL PAIN

## 2019-09-28 ASSESSMENT — PAIN DESCRIPTION - ORIENTATION: ORIENTATION: RIGHT

## 2019-09-28 ASSESSMENT — PAIN DESCRIPTION - FREQUENCY: FREQUENCY: INTERMITTENT

## 2019-09-29 ENCOUNTER — APPOINTMENT (OUTPATIENT)
Dept: GENERAL RADIOLOGY | Age: 64
DRG: 163 | End: 2019-09-29
Attending: THORACIC SURGERY (CARDIOTHORACIC VASCULAR SURGERY)
Payer: COMMERCIAL

## 2019-09-29 LAB
ANION GAP SERPL CALCULATED.3IONS-SCNC: 10 MMOL/L (ref 7–16)
BUN BLDV-MCNC: 22 MG/DL (ref 8–23)
CALCIUM SERPL-MCNC: 8.9 MG/DL (ref 8.6–10.2)
CHLORIDE BLD-SCNC: 100 MMOL/L (ref 98–107)
CO2: 25 MMOL/L (ref 22–29)
CREAT SERPL-MCNC: 1 MG/DL (ref 0.7–1.2)
GFR AFRICAN AMERICAN: >60
GFR NON-AFRICAN AMERICAN: >60 ML/MIN/1.73
GLUCOSE BLD-MCNC: 86 MG/DL (ref 74–99)
HCT VFR BLD CALC: 32.9 % (ref 37–54)
HEMOGLOBIN: 10.1 G/DL (ref 12.5–16.5)
MCH RBC QN AUTO: 28.5 PG (ref 26–35)
MCHC RBC AUTO-ENTMCNC: 30.7 % (ref 32–34.5)
MCV RBC AUTO: 92.9 FL (ref 80–99.9)
PDW BLD-RTO: 14.6 FL (ref 11.5–15)
PLATELET # BLD: 414 E9/L (ref 130–450)
PMV BLD AUTO: 8.7 FL (ref 7–12)
POTASSIUM SERPL-SCNC: 4.3 MMOL/L (ref 3.5–5)
RBC # BLD: 3.54 E12/L (ref 3.8–5.8)
SODIUM BLD-SCNC: 135 MMOL/L (ref 132–146)
VANCOMYCIN TROUGH: 6.8 MCG/ML (ref 5–16)
WBC # BLD: 20.9 E9/L (ref 4.5–11.5)

## 2019-09-29 PROCEDURE — 2140000000 HC CCU INTERMEDIATE R&B

## 2019-09-29 PROCEDURE — 76937 US GUIDE VASCULAR ACCESS: CPT

## 2019-09-29 PROCEDURE — 36569 INSJ PICC 5 YR+ W/O IMAGING: CPT

## 2019-09-29 PROCEDURE — 94640 AIRWAY INHALATION TREATMENT: CPT

## 2019-09-29 PROCEDURE — 80202 ASSAY OF VANCOMYCIN: CPT

## 2019-09-29 PROCEDURE — 71045 X-RAY EXAM CHEST 1 VIEW: CPT

## 2019-09-29 PROCEDURE — 2700000000 HC OXYGEN THERAPY PER DAY

## 2019-09-29 PROCEDURE — 6370000000 HC RX 637 (ALT 250 FOR IP): Performed by: NURSE PRACTITIONER

## 2019-09-29 PROCEDURE — C1751 CATH, INF, PER/CENT/MIDLINE: HCPCS

## 2019-09-29 PROCEDURE — 6360000002 HC RX W HCPCS: Performed by: SPECIALIST

## 2019-09-29 PROCEDURE — 2720000010 HC SURG SUPPLY STERILE

## 2019-09-29 PROCEDURE — 6360000002 HC RX W HCPCS: Performed by: INTERNAL MEDICINE

## 2019-09-29 PROCEDURE — 80048 BASIC METABOLIC PNL TOTAL CA: CPT

## 2019-09-29 PROCEDURE — 6360000002 HC RX W HCPCS: Performed by: NURSE PRACTITIONER

## 2019-09-29 PROCEDURE — 36415 COLL VENOUS BLD VENIPUNCTURE: CPT

## 2019-09-29 PROCEDURE — 2580000003 HC RX 258: Performed by: NURSE PRACTITIONER

## 2019-09-29 PROCEDURE — 2580000003 HC RX 258: Performed by: SPECIALIST

## 2019-09-29 PROCEDURE — 85027 COMPLETE CBC AUTOMATED: CPT

## 2019-09-29 RX ADMIN — SODIUM CHLORIDE: 9 INJECTION, SOLUTION INTRAVENOUS at 16:42

## 2019-09-29 RX ADMIN — BUDESONIDE 500 MCG: 0.5 SUSPENSION RESPIRATORY (INHALATION) at 09:14

## 2019-09-29 RX ADMIN — FORMOTEROL FUMARATE DIHYDRATE 20 MCG: 20 SOLUTION RESPIRATORY (INHALATION) at 20:07

## 2019-09-29 RX ADMIN — SENNOSIDES 8.6 MG: 8.6 TABLET, FILM COATED ORAL at 20:58

## 2019-09-29 RX ADMIN — BUDESONIDE 500 MCG: 0.5 SUSPENSION RESPIRATORY (INHALATION) at 20:06

## 2019-09-29 RX ADMIN — IPRATROPIUM BROMIDE AND ALBUTEROL SULFATE 1 AMPULE: .5; 3 SOLUTION RESPIRATORY (INHALATION) at 16:47

## 2019-09-29 RX ADMIN — DOCUSATE SODIUM 100 MG: 100 CAPSULE, LIQUID FILLED ORAL at 09:47

## 2019-09-29 RX ADMIN — MONTELUKAST SODIUM 10 MG: 10 TABLET ORAL at 20:58

## 2019-09-29 RX ADMIN — IPRATROPIUM BROMIDE AND ALBUTEROL SULFATE 1 AMPULE: .5; 3 SOLUTION RESPIRATORY (INHALATION) at 09:12

## 2019-09-29 RX ADMIN — Medication 10 ML: at 21:01

## 2019-09-29 RX ADMIN — VANCOMYCIN HYDROCHLORIDE 1.5 G: 10 INJECTION, POWDER, LYOPHILIZED, FOR SOLUTION INTRAVENOUS at 14:06

## 2019-09-29 RX ADMIN — AMLODIPINE BESYLATE 10 MG: 10 TABLET ORAL at 09:47

## 2019-09-29 RX ADMIN — IPRATROPIUM BROMIDE AND ALBUTEROL SULFATE 1 AMPULE: .5; 3 SOLUTION RESPIRATORY (INHALATION) at 12:16

## 2019-09-29 RX ADMIN — OXYCODONE HYDROCHLORIDE 5 MG: 5 TABLET ORAL at 16:42

## 2019-09-29 RX ADMIN — OXYCODONE HYDROCHLORIDE 5 MG: 5 TABLET ORAL at 21:00

## 2019-09-29 RX ADMIN — OXYCODONE HYDROCHLORIDE 5 MG: 5 TABLET ORAL at 09:47

## 2019-09-29 RX ADMIN — MUPIROCIN: 20 OINTMENT TOPICAL at 09:46

## 2019-09-29 RX ADMIN — DOCUSATE SODIUM 100 MG: 100 CAPSULE, LIQUID FILLED ORAL at 20:58

## 2019-09-29 RX ADMIN — OXYCODONE HYDROCHLORIDE 5 MG: 5 TABLET ORAL at 00:32

## 2019-09-29 RX ADMIN — FORMOTEROL FUMARATE DIHYDRATE 20 MCG: 20 SOLUTION RESPIRATORY (INHALATION) at 09:13

## 2019-09-29 RX ADMIN — MUPIROCIN: 20 OINTMENT TOPICAL at 20:58

## 2019-09-29 RX ADMIN — ENOXAPARIN SODIUM 40 MG: 40 INJECTION SUBCUTANEOUS at 09:46

## 2019-09-29 RX ADMIN — OXYCODONE HYDROCHLORIDE 5 MG: 5 TABLET ORAL at 04:36

## 2019-09-29 RX ADMIN — IPRATROPIUM BROMIDE AND ALBUTEROL SULFATE 1 AMPULE: .5; 3 SOLUTION RESPIRATORY (INHALATION) at 20:07

## 2019-09-29 RX ADMIN — MAGNESIUM HYDROXIDE 30 ML: 400 SUSPENSION ORAL at 11:20

## 2019-09-29 ASSESSMENT — PAIN SCALES - GENERAL
PAINLEVEL_OUTOF10: 2
PAINLEVEL_OUTOF10: 8
PAINLEVEL_OUTOF10: 7
PAINLEVEL_OUTOF10: 8
PAINLEVEL_OUTOF10: 6
PAINLEVEL_OUTOF10: 4
PAINLEVEL_OUTOF10: 0
PAINLEVEL_OUTOF10: 7

## 2019-09-29 ASSESSMENT — PAIN DESCRIPTION - PAIN TYPE
TYPE: SURGICAL PAIN
TYPE: SURGICAL PAIN
TYPE: SURGICAL PAIN;ACUTE PAIN

## 2019-09-29 ASSESSMENT — PAIN DESCRIPTION - ONSET
ONSET: ON-GOING

## 2019-09-29 ASSESSMENT — PAIN - FUNCTIONAL ASSESSMENT
PAIN_FUNCTIONAL_ASSESSMENT: PREVENTS OR INTERFERES SOME ACTIVE ACTIVITIES AND ADLS

## 2019-09-29 ASSESSMENT — PAIN DESCRIPTION - FREQUENCY
FREQUENCY: CONTINUOUS

## 2019-09-29 ASSESSMENT — PAIN DESCRIPTION - DESCRIPTORS
DESCRIPTORS: ACHING;DISCOMFORT
DESCRIPTORS: ACHING;DISCOMFORT;SORE
DESCRIPTORS: ACHING;DISCOMFORT;SORE

## 2019-09-29 ASSESSMENT — PAIN DESCRIPTION - PROGRESSION
CLINICAL_PROGRESSION: NOT CHANGED

## 2019-09-29 ASSESSMENT — PAIN DESCRIPTION - ORIENTATION
ORIENTATION: RIGHT

## 2019-09-29 ASSESSMENT — PAIN DESCRIPTION - LOCATION
LOCATION: RIB CAGE

## 2019-09-30 ENCOUNTER — APPOINTMENT (OUTPATIENT)
Dept: GENERAL RADIOLOGY | Age: 64
DRG: 163 | End: 2019-09-30
Attending: THORACIC SURGERY (CARDIOTHORACIC VASCULAR SURGERY)
Payer: COMMERCIAL

## 2019-09-30 VITALS
TEMPERATURE: 99.4 F | SYSTOLIC BLOOD PRESSURE: 122 MMHG | DIASTOLIC BLOOD PRESSURE: 59 MMHG | HEIGHT: 66 IN | BODY MASS INDEX: 28.16 KG/M2 | OXYGEN SATURATION: 93 % | RESPIRATION RATE: 16 BRPM | WEIGHT: 175.2 LBS | HEART RATE: 92 BPM

## 2019-09-30 LAB
ANION GAP SERPL CALCULATED.3IONS-SCNC: 12 MMOL/L (ref 7–16)
BUN BLDV-MCNC: 20 MG/DL (ref 8–23)
CALCIUM SERPL-MCNC: 8.6 MG/DL (ref 8.6–10.2)
CHLORIDE BLD-SCNC: 99 MMOL/L (ref 98–107)
CO2: 24 MMOL/L (ref 22–29)
CREAT SERPL-MCNC: 1 MG/DL (ref 0.7–1.2)
GFR AFRICAN AMERICAN: >60
GFR NON-AFRICAN AMERICAN: >60 ML/MIN/1.73
GLUCOSE BLD-MCNC: 97 MG/DL (ref 74–99)
HCT VFR BLD CALC: 29.1 % (ref 37–54)
HEMOGLOBIN: 9.1 G/DL (ref 12.5–16.5)
MCH RBC QN AUTO: 28.3 PG (ref 26–35)
MCHC RBC AUTO-ENTMCNC: 31.3 % (ref 32–34.5)
MCV RBC AUTO: 90.4 FL (ref 80–99.9)
PDW BLD-RTO: 14.5 FL (ref 11.5–15)
PLATELET # BLD: 334 E9/L (ref 130–450)
PMV BLD AUTO: 8.4 FL (ref 7–12)
POTASSIUM SERPL-SCNC: 4.3 MMOL/L (ref 3.5–5)
RBC # BLD: 3.22 E12/L (ref 3.8–5.8)
SODIUM BLD-SCNC: 135 MMOL/L (ref 132–146)
WBC # BLD: 19 E9/L (ref 4.5–11.5)

## 2019-09-30 PROCEDURE — 6360000002 HC RX W HCPCS: Performed by: INTERNAL MEDICINE

## 2019-09-30 PROCEDURE — 85027 COMPLETE CBC AUTOMATED: CPT

## 2019-09-30 PROCEDURE — 6370000000 HC RX 637 (ALT 250 FOR IP): Performed by: NURSE PRACTITIONER

## 2019-09-30 PROCEDURE — 97530 THERAPEUTIC ACTIVITIES: CPT

## 2019-09-30 PROCEDURE — 94640 AIRWAY INHALATION TREATMENT: CPT

## 2019-09-30 PROCEDURE — 80048 BASIC METABOLIC PNL TOTAL CA: CPT

## 2019-09-30 PROCEDURE — 36415 COLL VENOUS BLD VENIPUNCTURE: CPT

## 2019-09-30 PROCEDURE — 6360000002 HC RX W HCPCS: Performed by: NURSE PRACTITIONER

## 2019-09-30 PROCEDURE — 2580000003 HC RX 258: Performed by: SPECIALIST

## 2019-09-30 PROCEDURE — 71045 X-RAY EXAM CHEST 1 VIEW: CPT

## 2019-09-30 PROCEDURE — 2700000000 HC OXYGEN THERAPY PER DAY

## 2019-09-30 RX ORDER — ALBUTEROL SULFATE 90 UG/1
2 AEROSOL, METERED RESPIRATORY (INHALATION) EVERY 6 HOURS PRN
Qty: 1 INHALER | Refills: 3 | Status: SHIPPED | OUTPATIENT
Start: 2019-09-30 | End: 2019-12-23 | Stop reason: SDUPTHER

## 2019-09-30 RX ORDER — LINEZOLID 600 MG/1
600 TABLET, FILM COATED ORAL EVERY 12 HOURS SCHEDULED
Qty: 42 TABLET | Refills: 0 | Status: SHIPPED | OUTPATIENT
Start: 2019-09-30 | End: 2019-10-21

## 2019-09-30 RX ORDER — LINEZOLID 600 MG/1
600 TABLET, FILM COATED ORAL EVERY 12 HOURS SCHEDULED
Status: DISCONTINUED | OUTPATIENT
Start: 2019-09-30 | End: 2019-09-30 | Stop reason: HOSPADM

## 2019-09-30 RX ORDER — PSEUDOEPHEDRINE HCL 30 MG
100 TABLET ORAL 2 TIMES DAILY PRN
Qty: 15 CAPSULE | Refills: 0 | Status: SHIPPED | OUTPATIENT
Start: 2019-09-30 | End: 2019-12-23

## 2019-09-30 RX ORDER — OXYCODONE HYDROCHLORIDE AND ACETAMINOPHEN 5; 325 MG/1; MG/1
1 TABLET ORAL EVERY 6 HOURS PRN
Qty: 28 TABLET | Refills: 0 | Status: SHIPPED | OUTPATIENT
Start: 2019-09-30 | End: 2019-10-07

## 2019-09-30 RX ADMIN — BUDESONIDE 500 MCG: 0.5 SUSPENSION RESPIRATORY (INHALATION) at 09:42

## 2019-09-30 RX ADMIN — OXYCODONE HYDROCHLORIDE 5 MG: 5 TABLET ORAL at 03:27

## 2019-09-30 RX ADMIN — FORMOTEROL FUMARATE DIHYDRATE 20 MCG: 20 SOLUTION RESPIRATORY (INHALATION) at 09:41

## 2019-09-30 RX ADMIN — IPRATROPIUM BROMIDE AND ALBUTEROL SULFATE 1 AMPULE: .5; 3 SOLUTION RESPIRATORY (INHALATION) at 13:20

## 2019-09-30 RX ADMIN — DOCUSATE SODIUM 100 MG: 100 CAPSULE, LIQUID FILLED ORAL at 07:54

## 2019-09-30 RX ADMIN — SODIUM CHLORIDE: 9 INJECTION, SOLUTION INTRAVENOUS at 05:19

## 2019-09-30 RX ADMIN — Medication 10 ML: at 07:54

## 2019-09-30 RX ADMIN — MUPIROCIN: 20 OINTMENT TOPICAL at 07:53

## 2019-09-30 RX ADMIN — Medication 10 ML: at 03:28

## 2019-09-30 RX ADMIN — Medication 10 ML: at 03:27

## 2019-09-30 RX ADMIN — AMLODIPINE BESYLATE 10 MG: 10 TABLET ORAL at 07:54

## 2019-09-30 RX ADMIN — IPRATROPIUM BROMIDE AND ALBUTEROL SULFATE 1 AMPULE: .5; 3 SOLUTION RESPIRATORY (INHALATION) at 16:26

## 2019-09-30 RX ADMIN — IPRATROPIUM BROMIDE AND ALBUTEROL SULFATE 1 AMPULE: .5; 3 SOLUTION RESPIRATORY (INHALATION) at 09:40

## 2019-09-30 RX ADMIN — ENOXAPARIN SODIUM 40 MG: 40 INJECTION SUBCUTANEOUS at 07:54

## 2019-09-30 ASSESSMENT — PAIN DESCRIPTION - ONSET: ONSET: GRADUAL

## 2019-09-30 ASSESSMENT — PAIN SCALES - GENERAL
PAINLEVEL_OUTOF10: 3
PAINLEVEL_OUTOF10: 0
PAINLEVEL_OUTOF10: 7

## 2019-09-30 ASSESSMENT — PAIN DESCRIPTION - PROGRESSION: CLINICAL_PROGRESSION: GRADUALLY WORSENING

## 2019-09-30 ASSESSMENT — PAIN DESCRIPTION - ORIENTATION: ORIENTATION: RIGHT

## 2019-09-30 ASSESSMENT — PAIN DESCRIPTION - DESCRIPTORS: DESCRIPTORS: ACHING;DISCOMFORT;SORE;NAGGING

## 2019-09-30 ASSESSMENT — PAIN DESCRIPTION - LOCATION: LOCATION: RIB CAGE

## 2019-09-30 ASSESSMENT — PAIN DESCRIPTION - PAIN TYPE: TYPE: SURGICAL PAIN

## 2019-09-30 ASSESSMENT — PAIN - FUNCTIONAL ASSESSMENT: PAIN_FUNCTIONAL_ASSESSMENT: PREVENTS OR INTERFERES SOME ACTIVE ACTIVITIES AND ADLS

## 2019-09-30 ASSESSMENT — PAIN DESCRIPTION - FREQUENCY: FREQUENCY: INTERMITTENT

## 2019-10-01 ENCOUNTER — TELEPHONE (OUTPATIENT)
Dept: FAMILY MEDICINE CLINIC | Age: 64
End: 2019-10-01

## 2019-10-02 LAB
BLOOD CULTURE, ROUTINE: NORMAL
CULTURE, BLOOD 2: NORMAL

## 2019-10-14 ENCOUNTER — HOSPITAL ENCOUNTER (OUTPATIENT)
Dept: GENERAL RADIOLOGY | Age: 64
Discharge: HOME | End: 2019-10-16
Payer: COMMERCIAL

## 2019-10-14 ENCOUNTER — HOSPITAL ENCOUNTER (OUTPATIENT)
Age: 64
Discharge: HOME | End: 2019-10-16
Payer: COMMERCIAL

## 2019-10-14 DIAGNOSIS — J90 PLEURAL EFFUSION: ICD-10-CM

## 2019-10-14 PROCEDURE — 71046 X-RAY EXAM CHEST 2 VIEWS: CPT

## 2019-10-15 ENCOUNTER — OFFICE VISIT (OUTPATIENT)
Dept: CARDIOTHORACIC SURGERY | Age: 64
End: 2019-10-15

## 2019-10-15 VITALS
SYSTOLIC BLOOD PRESSURE: 129 MMHG | RESPIRATION RATE: 16 BRPM | DIASTOLIC BLOOD PRESSURE: 82 MMHG | HEART RATE: 96 BPM | BODY MASS INDEX: 28.32 KG/M2 | HEIGHT: 65 IN | OXYGEN SATURATION: 95 % | WEIGHT: 170 LBS

## 2019-10-15 DIAGNOSIS — J90 PLEURAL EFFUSION ON RIGHT: Primary | ICD-10-CM

## 2019-10-15 PROCEDURE — 99024 POSTOP FOLLOW-UP VISIT: CPT | Performed by: THORACIC SURGERY (CARDIOTHORACIC VASCULAR SURGERY)

## 2019-10-15 ASSESSMENT — ENCOUNTER SYMPTOMS
SHORTNESS OF BREATH: 0
COUGH: 0

## 2019-10-28 LAB
FUNGUS (MYCOLOGY) CULTURE: NORMAL
FUNGUS (MYCOLOGY) CULTURE: NORMAL
FUNGUS STAIN: NORMAL
FUNGUS STAIN: NORMAL

## 2019-12-13 DIAGNOSIS — T78.40XA ALLERGIC STATE, INITIAL ENCOUNTER: ICD-10-CM

## 2019-12-13 RX ORDER — MONTELUKAST SODIUM 10 MG/1
10 TABLET ORAL NIGHTLY
Qty: 90 TABLET | Refills: 1 | Status: SHIPPED
Start: 2019-12-13 | End: 2020-03-12 | Stop reason: SDUPTHER

## 2019-12-18 PROBLEM — J94.2 HEMOTHORAX: Status: ACTIVE | Noted: 2019-09-26

## 2019-12-18 ASSESSMENT — ENCOUNTER SYMPTOMS
CHEST TIGHTNESS: 0
SHORTNESS OF BREATH: 0
ABDOMINAL PAIN: 0
BLOOD IN STOOL: 0

## 2019-12-23 ENCOUNTER — OFFICE VISIT (OUTPATIENT)
Dept: FAMILY MEDICINE CLINIC | Age: 64
End: 2019-12-23
Payer: COMMERCIAL

## 2019-12-23 VITALS
OXYGEN SATURATION: 93 % | BODY MASS INDEX: 27.82 KG/M2 | SYSTOLIC BLOOD PRESSURE: 128 MMHG | TEMPERATURE: 98.8 F | HEIGHT: 66 IN | HEART RATE: 76 BPM | DIASTOLIC BLOOD PRESSURE: 72 MMHG | WEIGHT: 173.13 LBS

## 2019-12-23 DIAGNOSIS — J90 PLEURAL EFFUSION: ICD-10-CM

## 2019-12-23 DIAGNOSIS — J45.909 UNCOMPLICATED ASTHMA, UNSPECIFIED ASTHMA SEVERITY, UNSPECIFIED WHETHER PERSISTENT: ICD-10-CM

## 2019-12-23 DIAGNOSIS — I10 ESSENTIAL HYPERTENSION: Primary | ICD-10-CM

## 2019-12-23 DIAGNOSIS — J94.2 HEMOTHORAX: ICD-10-CM

## 2019-12-23 PROCEDURE — 99214 OFFICE O/P EST MOD 30 MIN: CPT | Performed by: FAMILY MEDICINE

## 2019-12-23 RX ORDER — AMLODIPINE BESYLATE 10 MG/1
10 TABLET ORAL DAILY
Qty: 90 TABLET | Refills: 1 | Status: SHIPPED
Start: 2019-12-23 | End: 2020-03-12 | Stop reason: SDUPTHER

## 2019-12-23 RX ORDER — ALBUTEROL SULFATE 90 UG/1
2 AEROSOL, METERED RESPIRATORY (INHALATION) EVERY 6 HOURS PRN
Qty: 1 INHALER | Refills: 3 | Status: SHIPPED
Start: 2019-12-23 | End: 2020-03-12 | Stop reason: SDUPTHER

## 2019-12-23 RX ORDER — PREDNISONE 10 MG/1
TABLET ORAL
Qty: 21 TABLET | Refills: 0 | Status: SHIPPED | OUTPATIENT
Start: 2019-12-23 | End: 2022-08-20 | Stop reason: ALTCHOICE

## 2019-12-23 ASSESSMENT — ENCOUNTER SYMPTOMS: WHEEZING: 1

## 2020-03-02 ENCOUNTER — TELEPHONE (OUTPATIENT)
Dept: FAMILY MEDICINE CLINIC | Age: 65
End: 2020-03-02

## 2020-03-02 RX ORDER — ALBUTEROL SULFATE 90 UG/1
2 AEROSOL, METERED RESPIRATORY (INHALATION) EVERY 6 HOURS PRN
Qty: 1 INHALER | Refills: 5 | Status: SHIPPED
Start: 2020-03-02 | End: 2020-03-30 | Stop reason: SDUPTHER

## 2020-03-02 NOTE — TELEPHONE ENCOUNTER
Spoke to Irving Cedillo at [facility], she had left a vm asking for a [facility]-14 to be sent from dates 9/21/2019-10/20/2019. Informed her that the PCP's here are not part of his worker's comp and Dr. Alverto Jonas is not the physician of record.

## 2020-03-03 NOTE — TELEPHONE ENCOUNTER
Has to be seen Include Z78.9 (Other Specified Conditions Influencing Health Status) As An Associated Diagnosis?: No

## 2020-03-12 ENCOUNTER — TELEPHONE (OUTPATIENT)
Dept: FAMILY MEDICINE CLINIC | Age: 65
End: 2020-03-12

## 2020-03-12 RX ORDER — ALBUTEROL SULFATE 90 UG/1
2 AEROSOL, METERED RESPIRATORY (INHALATION) EVERY 6 HOURS PRN
Qty: 1 INHALER | Refills: 5 | Status: CANCELLED | OUTPATIENT
Start: 2020-03-12

## 2020-03-12 RX ORDER — ALBUTEROL SULFATE 90 UG/1
2 AEROSOL, METERED RESPIRATORY (INHALATION) EVERY 6 HOURS PRN
Qty: 1 INHALER | Refills: 3 | Status: SHIPPED | OUTPATIENT
Start: 2020-03-12

## 2020-03-12 RX ORDER — MONTELUKAST SODIUM 10 MG/1
10 TABLET ORAL NIGHTLY
Qty: 90 TABLET | Refills: 1 | Status: SHIPPED | OUTPATIENT
Start: 2020-03-12

## 2020-03-12 RX ORDER — AMLODIPINE BESYLATE 10 MG/1
10 TABLET ORAL DAILY
Qty: 90 TABLET | Refills: 1 | Status: SHIPPED | OUTPATIENT
Start: 2020-03-12

## 2020-03-12 NOTE — TELEPHONE ENCOUNTER
Dillon Cea calling to tell you that his new insurance requires him to use [facility]. So he needs his meds sent there today. Singulair, Advair, Norvasc. Also either Ventolin or Proair. I have pended both.

## 2020-03-30 RX ORDER — ALBUTEROL SULFATE 90 UG/1
2 AEROSOL, METERED RESPIRATORY (INHALATION) EVERY 6 HOURS PRN
Qty: 3 INHALER | Refills: 1 | Status: CANCELLED | OUTPATIENT
Start: 2020-03-30

## 2020-03-30 RX ORDER — ALBUTEROL SULFATE 90 UG/1
2 AEROSOL, METERED RESPIRATORY (INHALATION) EVERY 6 HOURS PRN
Qty: 3 INHALER | Refills: 1 | Status: SHIPPED | OUTPATIENT
Start: 2020-03-30

## 2020-04-17 RX ORDER — TRIAMCINOLONE ACETONIDE 1 MG/G
CREAM TOPICAL
Qty: 1 TUBE | Refills: 2 | Status: SHIPPED | OUTPATIENT
Start: 2020-04-17

## 2020-04-17 NOTE — TELEPHONE ENCOUNTER
Last Appointment:  12/23/2019  Future Appointments   Date Time Provider Che Jackman   6/12/2020  3:30 PM [address] [facility] asking for a refill on his cream for his cracked skin. The cracked skin is flaring up on his hands to hand washing and .  Med pended

## 2022-08-20 ENCOUNTER — OFFICE VISIT (OUTPATIENT)
Dept: FAMILY MEDICINE CLINIC | Age: 67
End: 2022-08-20
Payer: COMMERCIAL

## 2022-08-20 VITALS
WEIGHT: 173 LBS | OXYGEN SATURATION: 95 % | BODY MASS INDEX: 27.8 KG/M2 | SYSTOLIC BLOOD PRESSURE: 122 MMHG | DIASTOLIC BLOOD PRESSURE: 78 MMHG | HEIGHT: 66 IN | HEART RATE: 73 BPM | TEMPERATURE: 98.9 F

## 2022-08-20 DIAGNOSIS — H92.03 OTALGIA, BILATERAL: ICD-10-CM

## 2022-08-20 DIAGNOSIS — H61.23 BILATERAL IMPACTED CERUMEN: Primary | ICD-10-CM

## 2022-08-20 DIAGNOSIS — R21 RASH AND NONSPECIFIC SKIN ERUPTION: ICD-10-CM

## 2022-08-20 PROCEDURE — 69210 REMOVE IMPACTED EAR WAX UNI: CPT | Performed by: PHYSICIAN ASSISTANT

## 2022-08-20 PROCEDURE — 1123F ACP DISCUSS/DSCN MKR DOCD: CPT | Performed by: PHYSICIAN ASSISTANT

## 2022-08-20 PROCEDURE — 99203 OFFICE O/P NEW LOW 30 MIN: CPT | Performed by: PHYSICIAN ASSISTANT

## 2022-08-20 RX ORDER — CLOTRIMAZOLE AND BETAMETHASONE DIPROPIONATE 10; .64 MG/G; MG/G
CREAM TOPICAL
Qty: 15 G | Refills: 0 | Status: SHIPPED | OUTPATIENT
Start: 2022-08-20

## 2022-08-20 ASSESSMENT — PATIENT HEALTH QUESTIONNAIRE - PHQ9
1. LITTLE INTEREST OR PLEASURE IN DOING THINGS: 0
SUM OF ALL RESPONSES TO PHQ9 QUESTIONS 1 & 2: 0
2. FEELING DOWN, DEPRESSED OR HOPELESS: 0
SUM OF ALL RESPONSES TO PHQ QUESTIONS 1-9: 0

## 2023-08-30 NOTE — PROGRESS NOTES
22  Estrada Ha : 1955 Sex: male  Age 78 y.o. Subjective:  Chief Complaint   Patient presents with    Ear Fullness     Bilateral ear fullness; ear wax, has tried OTC and can't seem to get it flushed out         HPI:   Estrada Ha , 78 y.o. male presents to express care for evaluation of bilateral ear fullness, wax    HPI  45-year-old male presents to express care for evaluation of bilateral ear wax, ear fullness. The patient states that this is a recurrent issue. The patient states that every year a year and a half he needs to come in and have his ears flushed. The patient is not having any upper respiratory symptoms. The patient is not having any chest pain, shortness of breath, no URI symptoms. The patient denies any other complaints at this time. ROS:   Unless otherwise stated in this report the patient's positive and negative responses for review of systems for constitutional, eyes, ENT, cardiovascular, respiratory, gastrointestinal, neurological, , musculoskeletal, and integument systems and related systems to the presenting problem are either stated in the history of present illness or were not pertinent or were negative for the symptoms and/or complaints related to the presenting medical problem. Positives and pertinent negatives as per HPI. All others reviewed and are negative.       PMH:     Past Medical History:   Diagnosis Date    Asthma     dr Nadia Jewell    Chest trauma 2019    work accident    Hypertension        Past Surgical History:   Procedure Laterality Date    THORACOSCOPY Right 2019    BRONCH, RIGHT VIDEO ASSISTED  THORACOSCOPY,  DECORTICATION, POSSIBLE THORACOTOMY performed by Roberto Adams MD at [address] [facility] Right 1996    plate       Family History   Problem Relation Age of Onset    High Blood Pressure Mother     No Known Problems Father        Medications:     Current Outpatient Medications: clotrimazole-betamethasone (LOTRISONE) 1-0.05 % cream, Apply topically 2 times daily. , Disp: 15 g, Rfl: 0    triamcinolone (KENALOG) 0.1 % cream, Apply topically 2 times daily. , Disp: 1 Tube, Rfl: 2    albuterol sulfate HFA (VENTOLIN HFA) 108 (90 Base) MCG/ACT inhaler, Inhale 2 puffs into the lungs every 6 hours as needed for Wheezing or Shortness of Breath, Disp: 3 Inhaler, Rfl: 1    montelukast (SINGULAIR) 10 MG tablet, Take 1 tablet by mouth nightly, Disp: 90 tablet, Rfl: 1    fluticasone-salmeterol (ADVAIR DISKUS) 500-50 MCG/DOSE diskus inhaler, Inhale 1 puff into the lungs 2 times daily, Disp: 3 Inhaler, Rfl: 1    amLODIPine (NORVASC) 10 MG tablet, Take 1 tablet by mouth daily, Disp: 90 tablet, Rfl: 1    albuterol sulfate HFA (PROAIR HFA) 108 (90 Base) MCG/ACT inhaler, Inhale 2 puffs into the lungs every 6 hours as needed for Wheezing or Shortness of Breath, Disp: 1 Inhaler, Rfl: 3    Multiple Vitamins-Minerals (MULTIVITAMIN ADULT PO), Take by mouth STOP PREOP MED, Disp: , Rfl:     Allergies: Allergies   Allergen Reactions    Salmeterol Other (See Comments)     Lung issues   On advair ? Sulfa Antibiotics Other (See Comments)     Reaction as a child        Social History:     Social History     Tobacco Use    Smoking status: Never    Smokeless tobacco: Never   Vaping Use    Vaping Use: Never used   Substance Use Topics    Alcohol use: Never       Patient lives at home. Physical Exam:     Vitals:    08/20/22 0807   BP: 122/78   Pulse: 73   Temp: 98.9 °F (37.2 °C)   TempSrc: Temporal   SpO2: 95%   Weight: 173 lb (78.5 kg)   Height: 5' 6\" (1.676 m)       Exam:  Physical Exam  Nurse's notes and vital signs reviewed. The patient is not hypoxic. ? General: Alert, no acute distress, patient resting comfortably Patient is not toxic or lethargic. Skin: Warm, intact, no pallor noted. There is no evidence of rash at this time.   Head: Normocephalic, atraumatic  Eye: Normal conjunctiva  Ears, Nose, Throat: Right and left tympanic membrane are obscured and not able to be visualized because of cerumen impaction, worse on the left than on the right. No drainage or discharge noted. No pre- or post-auricular tenderness, erythema, or swelling noted. No rhinorrhea or congestion noted. Posterior oropharynx shows no erythema, tonsillar hypertrophy, or exudate. the uvula is midline. No trismus or drooling is noted. Moist mucous membranes. Cardiovascular: Regular Rate and Rhythm  Respiratory: No acute distress, mild expiratory wheeze. No stridor or retractions are noted. Neurological: A&O x4, normal speech  Psychiatric: Cooperative       Testing:           Medical Decision Making:     Vital signs reviewed    Past medical history reviewed. Allergies reviewed. Medications reviewed. Patient on arrival does not appear to be in any apparent distress or discomfort. The patient has been seen and evaluated. The patient does not appear to be toxic or lethargic. The patient will have both ears irrigated and will remove wax with ear curette. The patient will be reassessed. Repeat evaluation shows evidence of complete removal of the wax from bilateral external canals. There is no evidence of tympanic membrane erythema. The patient did note a small area to the posterior aspect of the neck as a rash. The patient has more of a plaque-like area to this area. Patient states that does not really bother him. He has noted the area for least a month if not longer. The patient is not having any fevers. The patient will be given Lotrisone to be applied to the area. The patient will follow-up with PCP. Call with any questions or concerns. The patient is to return to express care or go directly to the emergency department should any of the signs or symptoms worsen. The patient is to followup with primary care physician in 2-3 days for repeat evaluation.  The patient has no other questions or concerns at this time the patient will be discharged home. Clinical Impression:   Monica Stahl was seen today for ear fullness. Diagnoses and all orders for this visit:    Bilateral impacted cerumen  -     96624 - CA REMOVE IMPACTED EAR WAX    Otalgia, bilateral    Rash and nonspecific skin eruption    Other orders  -     clotrimazole-betamethasone (LOTRISONE) 1-0.05 % cream; Apply topically 2 times daily. The patient is to call for any concerns or return if any of the signs or symptoms worsen. The patient is to follow-up with PCP in the next 2-3 days for repeat evaluation repeat assessment or go directly to the emergency department.      SIGNATURE: Alesha Monge III, PA-C Minoxidil Pregnancy And Lactation Text: This medication has not been assigned a Pregnancy Risk Category but animal studies failed to show danger with the topical medication. It is unknown if the medication is excreted in breast milk.

## 2024-12-10 ENCOUNTER — HOSPITAL ENCOUNTER (EMERGENCY)
Age: 69
Discharge: LEFT BEFORE DISCHARGE | End: 2024-12-11
Attending: EMERGENCY MEDICINE
Payer: COMMERCIAL

## 2024-12-10 ENCOUNTER — APPOINTMENT (OUTPATIENT)
Dept: CT IMAGING | Age: 69
End: 2024-12-10
Payer: COMMERCIAL

## 2024-12-10 VITALS
HEART RATE: 91 BPM | DIASTOLIC BLOOD PRESSURE: 75 MMHG | OXYGEN SATURATION: 95 % | SYSTOLIC BLOOD PRESSURE: 152 MMHG | WEIGHT: 170 LBS | HEIGHT: 66 IN | BODY MASS INDEX: 27.32 KG/M2 | RESPIRATION RATE: 16 BRPM | TEMPERATURE: 98.2 F

## 2024-12-10 DIAGNOSIS — J18.9 PNEUMONIA OF RIGHT LOWER LOBE DUE TO INFECTIOUS ORGANISM: Primary | ICD-10-CM

## 2024-12-10 PROCEDURE — 99285 EMERGENCY DEPT VISIT HI MDM: CPT

## 2024-12-10 PROCEDURE — 71250 CT THORAX DX C-: CPT

## 2024-12-11 ENCOUNTER — APPOINTMENT (OUTPATIENT)
Dept: CT IMAGING | Age: 69
End: 2024-12-11
Payer: COMMERCIAL

## 2024-12-11 ENCOUNTER — HOSPITAL ENCOUNTER (INPATIENT)
Age: 69
LOS: 3 days | Discharge: HOME | End: 2024-12-14
Attending: STUDENT IN AN ORGANIZED HEALTH CARE EDUCATION/TRAINING PROGRAM | Admitting: INTERNAL MEDICINE
Payer: MEDICARE

## 2024-12-11 ENCOUNTER — APPOINTMENT (OUTPATIENT)
Dept: GENERAL RADIOLOGY | Age: 69
End: 2024-12-11
Payer: MEDICARE

## 2024-12-11 DIAGNOSIS — J18.9 PNEUMONIA DUE TO INFECTIOUS ORGANISM, UNSPECIFIED LATERALITY, UNSPECIFIED PART OF LUNG: Primary | ICD-10-CM

## 2024-12-11 DIAGNOSIS — N17.9 AKI (ACUTE KIDNEY INJURY) (HCC): ICD-10-CM

## 2024-12-11 PROBLEM — R04.2 HEMOPTYSIS: Status: ACTIVE | Noted: 2024-12-11

## 2024-12-11 PROBLEM — J15.9 BACTERIAL PNEUMONIA: Status: ACTIVE | Noted: 2024-12-11

## 2024-12-11 PROBLEM — I10 PRIMARY HYPERTENSION: Status: ACTIVE | Noted: 2024-12-11

## 2024-12-11 LAB
ALBUMIN SERPL-MCNC: 3.2 G/DL (ref 3.5–5.2)
ALP SERPL-CCNC: 103 U/L (ref 40–129)
ALT SERPL-CCNC: 45 U/L (ref 0–40)
ANION GAP SERPL CALCULATED.3IONS-SCNC: 11 MMOL/L (ref 7–16)
ANION GAP SERPL CALCULATED.3IONS-SCNC: 12 MMOL/L (ref 7–16)
AST SERPL-CCNC: 31 U/L (ref 0–39)
BASOPHILS # BLD: 0.09 K/UL (ref 0–0.2)
BASOPHILS # BLD: 0.09 K/UL (ref 0–0.2)
BASOPHILS NFR BLD: 1 % (ref 0–2)
BASOPHILS NFR BLD: 1 % (ref 0–2)
BILIRUB SERPL-MCNC: 0.5 MG/DL (ref 0–1.2)
BILIRUB UR QL STRIP: NEGATIVE
BUN SERPL-MCNC: 33 MG/DL (ref 6–23)
BUN SERPL-MCNC: 41 MG/DL (ref 6–23)
CALCIUM SERPL-MCNC: 9.2 MG/DL (ref 8.6–10.2)
CALCIUM SERPL-MCNC: 9.4 MG/DL (ref 8.6–10.2)
CHLORIDE SERPL-SCNC: 102 MMOL/L (ref 98–107)
CHLORIDE SERPL-SCNC: 103 MMOL/L (ref 98–107)
CLARITY UR: CLEAR
CO2 SERPL-SCNC: 22 MMOL/L (ref 22–29)
CO2 SERPL-SCNC: 22 MMOL/L (ref 22–29)
COLOR UR: YELLOW
CREAT SERPL-MCNC: 1.3 MG/DL (ref 0.7–1.2)
CREAT SERPL-MCNC: 1.4 MG/DL (ref 0.7–1.2)
EOSINOPHIL # BLD: 0.19 K/UL (ref 0.05–0.5)
EOSINOPHIL # BLD: 0.29 K/UL (ref 0.05–0.5)
EOSINOPHILS RELATIVE PERCENT: 1 % (ref 0–6)
EOSINOPHILS RELATIVE PERCENT: 2 % (ref 0–6)
ERYTHROCYTE [DISTWIDTH] IN BLOOD BY AUTOMATED COUNT: 13.4 % (ref 11.5–15)
ERYTHROCYTE [DISTWIDTH] IN BLOOD BY AUTOMATED COUNT: 13.4 % (ref 11.5–15)
GFR, ESTIMATED: 57 ML/MIN/1.73M2
GFR, ESTIMATED: 59 ML/MIN/1.73M2
GLUCOSE SERPL-MCNC: 115 MG/DL (ref 74–99)
GLUCOSE SERPL-MCNC: 93 MG/DL (ref 74–99)
GLUCOSE UR STRIP-MCNC: NEGATIVE MG/DL
HCT VFR BLD AUTO: 35.7 % (ref 37–54)
HCT VFR BLD AUTO: 38.3 % (ref 37–54)
HGB BLD-MCNC: 11.5 G/DL (ref 12.5–16.5)
HGB BLD-MCNC: 11.9 G/DL (ref 12.5–16.5)
HGB UR QL STRIP.AUTO: NEGATIVE
IMM GRANULOCYTES # BLD AUTO: 0.27 K/UL (ref 0–0.58)
IMM GRANULOCYTES # BLD AUTO: 0.3 K/UL (ref 0–0.58)
IMM GRANULOCYTES NFR BLD: 2 % (ref 0–5)
IMM GRANULOCYTES NFR BLD: 2 % (ref 0–5)
KETONES UR STRIP-MCNC: NEGATIVE MG/DL
LACTATE BLDV-SCNC: 1 MMOL/L (ref 0.5–2.2)
LEUKOCYTE ESTERASE UR QL STRIP: NEGATIVE
LYMPHOCYTES NFR BLD: 2.72 K/UL (ref 1.5–4)
LYMPHOCYTES NFR BLD: 2.77 K/UL (ref 1.5–4)
LYMPHOCYTES RELATIVE PERCENT: 16 % (ref 20–42)
LYMPHOCYTES RELATIVE PERCENT: 18 % (ref 20–42)
MCH RBC QN AUTO: 29 PG (ref 26–35)
MCH RBC QN AUTO: 29.6 PG (ref 26–35)
MCHC RBC AUTO-ENTMCNC: 31.1 G/DL (ref 32–34.5)
MCHC RBC AUTO-ENTMCNC: 32.2 G/DL (ref 32–34.5)
MCV RBC AUTO: 91.8 FL (ref 80–99.9)
MCV RBC AUTO: 93.2 FL (ref 80–99.9)
MONOCYTES NFR BLD: 1.22 K/UL (ref 0.1–0.95)
MONOCYTES NFR BLD: 1.24 K/UL (ref 0.1–0.95)
MONOCYTES NFR BLD: 7 % (ref 2–12)
MONOCYTES NFR BLD: 8 % (ref 2–12)
NEUTROPHILS NFR BLD: 70 % (ref 43–80)
NEUTROPHILS NFR BLD: 73 % (ref 43–80)
NEUTS SEG NFR BLD: 10.92 K/UL (ref 1.8–7.3)
NEUTS SEG NFR BLD: 12.38 K/UL (ref 1.8–7.3)
NITRITE UR QL STRIP: NEGATIVE
PH UR STRIP: 6 [PH] (ref 5–9)
PLATELET # BLD AUTO: 396 K/UL (ref 130–450)
PLATELET # BLD AUTO: 412 K/UL (ref 130–450)
PMV BLD AUTO: 8.8 FL (ref 7–12)
PMV BLD AUTO: 8.9 FL (ref 7–12)
POTASSIUM SERPL-SCNC: 4 MMOL/L (ref 3.5–5)
POTASSIUM SERPL-SCNC: 4.2 MMOL/L (ref 3.5–5)
PROCALCITONIN SERPL-MCNC: 0.11 NG/ML (ref 0–0.08)
PROT SERPL-MCNC: 7.7 G/DL (ref 6.4–8.3)
PROT UR STRIP-MCNC: NEGATIVE MG/DL
RBC # BLD AUTO: 3.89 M/UL (ref 3.8–5.8)
RBC # BLD AUTO: 4.11 M/UL (ref 3.8–5.8)
RBC #/AREA URNS HPF: NORMAL /HPF
SODIUM SERPL-SCNC: 135 MMOL/L (ref 132–146)
SODIUM SERPL-SCNC: 137 MMOL/L (ref 132–146)
SP GR UR STRIP: 1.02 (ref 1–1.03)
UROBILINOGEN UR STRIP-ACNC: 0.2 EU/DL (ref 0–1)
WBC #/AREA URNS HPF: NORMAL /HPF
WBC OTHER # BLD: 15.5 K/UL (ref 4.5–11.5)
WBC OTHER # BLD: 16.9 K/UL (ref 4.5–11.5)

## 2024-12-11 PROCEDURE — 6370000000 HC RX 637 (ALT 250 FOR IP): Performed by: EMERGENCY MEDICINE

## 2024-12-11 PROCEDURE — 71045 X-RAY EXAM CHEST 1 VIEW: CPT

## 2024-12-11 PROCEDURE — 6360000002 HC RX W HCPCS: Performed by: INTERNAL MEDICINE

## 2024-12-11 PROCEDURE — 74177 CT ABD & PELVIS W/CONTRAST: CPT

## 2024-12-11 PROCEDURE — 6360000002 HC RX W HCPCS: Performed by: STUDENT IN AN ORGANIZED HEALTH CARE EDUCATION/TRAINING PROGRAM

## 2024-12-11 PROCEDURE — 87040 BLOOD CULTURE FOR BACTERIA: CPT

## 2024-12-11 PROCEDURE — 2060000000 HC ICU INTERMEDIATE R&B

## 2024-12-11 PROCEDURE — 6360000002 HC RX W HCPCS: Performed by: EMERGENCY MEDICINE

## 2024-12-11 PROCEDURE — 83605 ASSAY OF LACTIC ACID: CPT

## 2024-12-11 PROCEDURE — 71260 CT THORAX DX C+: CPT

## 2024-12-11 PROCEDURE — 94640 AIRWAY INHALATION TREATMENT: CPT

## 2024-12-11 PROCEDURE — 85025 COMPLETE CBC W/AUTO DIFF WBC: CPT

## 2024-12-11 PROCEDURE — 80048 BASIC METABOLIC PNL TOTAL CA: CPT

## 2024-12-11 PROCEDURE — 2580000003 HC RX 258: Performed by: STUDENT IN AN ORGANIZED HEALTH CARE EDUCATION/TRAINING PROGRAM

## 2024-12-11 PROCEDURE — 80053 COMPREHEN METABOLIC PANEL: CPT

## 2024-12-11 PROCEDURE — 99285 EMERGENCY DEPT VISIT HI MDM: CPT

## 2024-12-11 PROCEDURE — 99223 1ST HOSP IP/OBS HIGH 75: CPT | Performed by: INTERNAL MEDICINE

## 2024-12-11 PROCEDURE — 81001 URINALYSIS AUTO W/SCOPE: CPT

## 2024-12-11 PROCEDURE — 87449 NOS EACH ORGANISM AG IA: CPT

## 2024-12-11 PROCEDURE — 84145 PROCALCITONIN (PCT): CPT

## 2024-12-11 PROCEDURE — 96365 THER/PROPH/DIAG IV INF INIT: CPT

## 2024-12-11 PROCEDURE — 2580000003 HC RX 258: Performed by: EMERGENCY MEDICINE

## 2024-12-11 PROCEDURE — 6360000004 HC RX CONTRAST MEDICATION: Performed by: RADIOLOGY

## 2024-12-11 PROCEDURE — 87899 AGENT NOS ASSAY W/OPTIC: CPT

## 2024-12-11 RX ORDER — VANCOMYCIN 1.5 G/300ML
20 INJECTION, SOLUTION INTRAVENOUS ONCE
Status: COMPLETED | OUTPATIENT
Start: 2024-12-11 | End: 2024-12-11

## 2024-12-11 RX ORDER — ALBUTEROL SULFATE 0.83 MG/ML
2.5 SOLUTION RESPIRATORY (INHALATION) EVERY 4 HOURS PRN
Status: DISCONTINUED | OUTPATIENT
Start: 2024-12-11 | End: 2024-12-14 | Stop reason: HOSPADM

## 2024-12-11 RX ORDER — PIPERACILLIN SODIUM, TAZOBACTAM SODIUM 4; .5 G/20ML; G/20ML
4500 INJECTION, POWDER, LYOPHILIZED, FOR SOLUTION INTRAVENOUS EVERY 6 HOURS
Status: DISCONTINUED | OUTPATIENT
Start: 2024-12-12 | End: 2024-12-12

## 2024-12-11 RX ORDER — DOXYCYCLINE 100 MG/1
100 CAPSULE ORAL ONCE
Status: COMPLETED | OUTPATIENT
Start: 2024-12-11 | End: 2024-12-11

## 2024-12-11 RX ORDER — IOPAMIDOL 755 MG/ML
75 INJECTION, SOLUTION INTRAVASCULAR
Status: COMPLETED | OUTPATIENT
Start: 2024-12-11 | End: 2024-12-11

## 2024-12-11 RX ORDER — SODIUM CHLORIDE 9 MG/ML
20 INJECTION, SOLUTION INTRAMUSCULAR; INTRAVENOUS; SUBCUTANEOUS EVERY 6 HOURS
Status: DISCONTINUED | OUTPATIENT
Start: 2024-12-12 | End: 2024-12-12

## 2024-12-11 RX ORDER — 0.9 % SODIUM CHLORIDE 0.9 %
1000 INTRAVENOUS SOLUTION INTRAVENOUS ONCE
Status: COMPLETED | OUTPATIENT
Start: 2024-12-11 | End: 2024-12-11

## 2024-12-11 RX ADMIN — PIPERACILLIN AND TAZOBACTAM 4500 MG: 4; .5 INJECTION, POWDER, FOR SOLUTION INTRAVENOUS at 04:34

## 2024-12-11 RX ADMIN — IOPAMIDOL 75 ML: 755 INJECTION, SOLUTION INTRAVENOUS at 02:19

## 2024-12-11 RX ADMIN — VANCOMYCIN 1500 MG: 1.5 INJECTION, SOLUTION INTRAVENOUS at 21:28

## 2024-12-11 RX ADMIN — SODIUM CHLORIDE 1000 ML: 9 INJECTION, SOLUTION INTRAVENOUS at 19:57

## 2024-12-11 RX ADMIN — PIPERACILLIN AND TAZOBACTAM 4500 MG: 4; .5 INJECTION, POWDER, FOR SOLUTION INTRAVENOUS at 19:59

## 2024-12-11 RX ADMIN — AMOXICILLIN AND CLAVULANATE POTASSIUM 1 TABLET: 875; 125 TABLET, FILM COATED ORAL at 04:59

## 2024-12-11 RX ADMIN — DOXYCYCLINE HYCLATE 100 MG: 100 CAPSULE ORAL at 04:59

## 2024-12-11 RX ADMIN — ALBUTEROL SULFATE 2.5 MG: 2.5 SOLUTION RESPIRATORY (INHALATION) at 22:00

## 2024-12-11 ASSESSMENT — LIFESTYLE VARIABLES
HOW OFTEN DO YOU HAVE A DRINK CONTAINING ALCOHOL: NEVER
HOW MANY STANDARD DRINKS CONTAINING ALCOHOL DO YOU HAVE ON A TYPICAL DAY: PATIENT DOES NOT DRINK
HOW OFTEN DO YOU HAVE A DRINK CONTAINING ALCOHOL: NEVER
HOW MANY STANDARD DRINKS CONTAINING ALCOHOL DO YOU HAVE ON A TYPICAL DAY: PATIENT DOES NOT DRINK

## 2024-12-11 NOTE — ED PROVIDER NOTES
resolution.   2. Mediastinal and right perihilar lymphadenopathy, nonspecific but most   likely reactive.   3. Small hiatal hernia.             ED COURSE   Vitals:    Vitals:    12/10/24 1804   BP: (!) 152/75   Pulse: 91   Resp: 16   Temp: 98.2 °F (36.8 °C)   TempSrc: Oral   SpO2: 95%   Weight: 77.1 kg (170 lb)   Height: 1.676 m (5' 6\")       Patient was given the following medications:  Medications   piperacillin-tazobactam (ZOSYN) 4,500 mg in sodium chloride 0.9 % 100 mL IVPB (vial-mate) (has no administration in time range)   iopamidol (ISOVUE-370) 76 % injection 75 mL (75 mLs IntraVENous Given 12/11/24 0219)       ED Course as of 12/11/24 0359   Wed Dec 11, 2024   0327 This patient has chosen to leave against medical advice.  I have personally explained to them that choosing to do so may result in permanent bodily harm or death.  I discussed at length that without further evaluation and monitoring there may be unforeseen circumstances and deterioration resulting in permanent bodily harm or death as a result of their choice.  They are alert, oriented, and competent at this time.  They state that they are aware of the serious risks as explained, but they continue to wish to leave against medical   advice.   In light of their decision to leave against medical advice, follow-up has been arranged and they are aware of the importance of following up as instructed.  They have been advised that they should return to the ED immediately if they change their mind at any time, or if their condition begins to change or worsen.     [BP]      ED Course User Index  [BP] Tereso Doran, DO     PROCEDURES   none    REASSESSMENT   12/11/24       Time: Patient remains comfortable at rest, no conversational dyspnea    CONSULTS:  None  DIFFERENTIAL DX: Rib fracture, pneumothorax, pneumonia, neoplasm, hemothorax   MDM:   Social Determinants : None    Records Reviewed : 9/26/2019-patient was seen by Dr. Armando for a VATS procedure

## 2024-12-11 NOTE — ED PROVIDER NOTES
Lester Quiroz is a 69-year-old male present emerged part with concern for blood-tinged sputum.  Patient stated that he did have a fall last week.  Patient stated that after the fall he was having reproducible chest wall pain.  Patient stated that began to improve however patient began to have episodes of blood-tinged sputum over the weekend.  Worse yesterday.  Patient presented to emergency department was found to have pna. Patient declined admission and left ama since he did not have anyone to care for his dog. Patient went to his pulmonologist today and was sent in for admission.     The history is provided by the patient and medical records.        Review of Systems   Constitutional:  Negative for chills, diaphoresis, fatigue and fever.   Eyes:  Negative for photophobia and visual disturbance.   Respiratory:  Positive for cough and shortness of breath. Negative for chest tightness.    Cardiovascular:  Negative for chest pain, palpitations and leg swelling.   Gastrointestinal:  Negative for abdominal distention, abdominal pain, diarrhea, nausea and vomiting.   Genitourinary:  Negative for dysuria.   Musculoskeletal:  Negative for back pain, neck pain and neck stiffness.   Skin:  Negative for pallor and rash.   Neurological:  Negative for headaches.   Psychiatric/Behavioral:  Negative for confusion.         Physical Exam  Vitals and nursing note reviewed.   Constitutional:       General: He is not in acute distress.     Appearance: Normal appearance. He is not ill-appearing.   HENT:      Head: Normocephalic and atraumatic.   Eyes:      General: No scleral icterus.     Conjunctiva/sclera: Conjunctivae normal.      Pupils: Pupils are equal, round, and reactive to light.   Cardiovascular:      Rate and Rhythm: Normal rate and regular rhythm.   Pulmonary:      Effort: Pulmonary effort is normal.      Breath sounds: Rhonchi present.   Abdominal:      General: Bowel sounds are normal. There is no distension.

## 2024-12-12 LAB
ALBUMIN SERPL-MCNC: 3 G/DL (ref 3.5–5.2)
ALP SERPL-CCNC: 81 U/L (ref 40–129)
ALT SERPL-CCNC: 38 U/L (ref 0–40)
ANION GAP SERPL CALCULATED.3IONS-SCNC: 9 MMOL/L (ref 7–16)
AST SERPL-CCNC: 24 U/L (ref 0–39)
BASOPHILS # BLD: 0.11 K/UL (ref 0–0.2)
BASOPHILS NFR BLD: 1 % (ref 0–2)
BILIRUB SERPL-MCNC: 0.6 MG/DL (ref 0–1.2)
BUN SERPL-MCNC: 26 MG/DL (ref 6–23)
CALCIUM SERPL-MCNC: 8.5 MG/DL (ref 8.6–10.2)
CHLORIDE SERPL-SCNC: 107 MMOL/L (ref 98–107)
CO2 SERPL-SCNC: 22 MMOL/L (ref 22–29)
CREAT SERPL-MCNC: 1.4 MG/DL (ref 0.7–1.2)
EOSINOPHIL # BLD: 0.44 K/UL (ref 0.05–0.5)
EOSINOPHILS RELATIVE PERCENT: 3 % (ref 0–6)
ERYTHROCYTE [DISTWIDTH] IN BLOOD BY AUTOMATED COUNT: 13.6 % (ref 11.5–15)
GFR, ESTIMATED: 57 ML/MIN/1.73M2
GLUCOSE SERPL-MCNC: 97 MG/DL (ref 74–99)
HCT VFR BLD AUTO: 32.3 % (ref 37–54)
HGB BLD-MCNC: 10.4 G/DL (ref 12.5–16.5)
IMM GRANULOCYTES # BLD AUTO: 0.29 K/UL (ref 0–0.58)
IMM GRANULOCYTES NFR BLD: 2 % (ref 0–5)
INR PPP: 1.3
L PNEUMO1 AG UR QL IA.RAPID: NEGATIVE
LACTATE BLDV-SCNC: 0.8 MMOL/L (ref 0.5–2.2)
LYMPHOCYTES NFR BLD: 2.21 K/UL (ref 1.5–4)
LYMPHOCYTES RELATIVE PERCENT: 17 % (ref 20–42)
MCH RBC QN AUTO: 29.6 PG (ref 26–35)
MCHC RBC AUTO-ENTMCNC: 32.2 G/DL (ref 32–34.5)
MCV RBC AUTO: 92 FL (ref 80–99.9)
MONOCYTES NFR BLD: 1.1 K/UL (ref 0.1–0.95)
MONOCYTES NFR BLD: 8 % (ref 2–12)
NEUTROPHILS NFR BLD: 69 % (ref 43–80)
NEUTS SEG NFR BLD: 9.21 K/UL (ref 1.8–7.3)
PARTIAL THROMBOPLASTIN TIME: 31.9 SEC (ref 24.5–35.1)
PHOSPHATE SERPL-MCNC: 3.7 MG/DL (ref 2.5–4.5)
PLATELET # BLD AUTO: 362 K/UL (ref 130–450)
PMV BLD AUTO: 8.7 FL (ref 7–12)
POTASSIUM SERPL-SCNC: 4.2 MMOL/L (ref 3.5–5)
PROT SERPL-MCNC: 6.9 G/DL (ref 6.4–8.3)
PROTHROMBIN TIME: 13.5 SEC (ref 9.3–12.4)
RBC # BLD AUTO: 3.51 M/UL (ref 3.8–5.8)
S PNEUM AG SPEC QL: NEGATIVE
SODIUM SERPL-SCNC: 138 MMOL/L (ref 132–146)
SPECIMEN SOURCE: NORMAL
WBC OTHER # BLD: 13.4 K/UL (ref 4.5–11.5)

## 2024-12-12 PROCEDURE — 2580000003 HC RX 258: Performed by: INTERNAL MEDICINE

## 2024-12-12 PROCEDURE — 99232 SBSQ HOSP IP/OBS MODERATE 35: CPT | Performed by: STUDENT IN AN ORGANIZED HEALTH CARE EDUCATION/TRAINING PROGRAM

## 2024-12-12 PROCEDURE — 85610 PROTHROMBIN TIME: CPT

## 2024-12-12 PROCEDURE — 85730 THROMBOPLASTIN TIME PARTIAL: CPT

## 2024-12-12 PROCEDURE — 85025 COMPLETE CBC W/AUTO DIFF WBC: CPT

## 2024-12-12 PROCEDURE — 80053 COMPREHEN METABOLIC PANEL: CPT

## 2024-12-12 PROCEDURE — 87070 CULTURE OTHR SPECIMN AEROBIC: CPT

## 2024-12-12 PROCEDURE — 6360000002 HC RX W HCPCS: Performed by: INTERNAL MEDICINE

## 2024-12-12 PROCEDURE — 6370000000 HC RX 637 (ALT 250 FOR IP): Performed by: SPECIALIST

## 2024-12-12 PROCEDURE — 84100 ASSAY OF PHOSPHORUS: CPT

## 2024-12-12 PROCEDURE — 87205 SMEAR GRAM STAIN: CPT

## 2024-12-12 PROCEDURE — 2060000000 HC ICU INTERMEDIATE R&B

## 2024-12-12 PROCEDURE — 6370000000 HC RX 637 (ALT 250 FOR IP): Performed by: INTERNAL MEDICINE

## 2024-12-12 PROCEDURE — 83605 ASSAY OF LACTIC ACID: CPT

## 2024-12-12 PROCEDURE — 87081 CULTURE SCREEN ONLY: CPT

## 2024-12-12 RX ORDER — SODIUM CHLORIDE 0.9 % (FLUSH) 0.9 %
5-40 SYRINGE (ML) INJECTION EVERY 12 HOURS SCHEDULED
Status: DISCONTINUED | OUTPATIENT
Start: 2024-12-12 | End: 2024-12-14 | Stop reason: HOSPADM

## 2024-12-12 RX ORDER — SODIUM CHLORIDE 9 MG/ML
INJECTION, SOLUTION INTRAVENOUS CONTINUOUS
Status: DISCONTINUED | OUTPATIENT
Start: 2024-12-12 | End: 2024-12-12

## 2024-12-12 RX ORDER — MONTELUKAST SODIUM 10 MG/1
10 TABLET ORAL NIGHTLY
Status: DISCONTINUED | OUTPATIENT
Start: 2024-12-12 | End: 2024-12-14 | Stop reason: HOSPADM

## 2024-12-12 RX ORDER — LINEZOLID 600 MG/1
600 TABLET, FILM COATED ORAL 2 TIMES DAILY
Status: DISCONTINUED | OUTPATIENT
Start: 2024-12-12 | End: 2024-12-13

## 2024-12-12 RX ORDER — VANCOMYCIN 1.5 G/300ML
1500 INJECTION, SOLUTION INTRAVENOUS EVERY 24 HOURS
Status: DISCONTINUED | OUTPATIENT
Start: 2024-12-12 | End: 2024-12-12

## 2024-12-12 RX ORDER — SODIUM CHLORIDE 0.9 % (FLUSH) 0.9 %
5-40 SYRINGE (ML) INJECTION PRN
Status: DISCONTINUED | OUTPATIENT
Start: 2024-12-12 | End: 2024-12-14 | Stop reason: HOSPADM

## 2024-12-12 RX ORDER — ONDANSETRON 2 MG/ML
4 INJECTION INTRAMUSCULAR; INTRAVENOUS EVERY 6 HOURS PRN
Status: DISCONTINUED | OUTPATIENT
Start: 2024-12-12 | End: 2024-12-14 | Stop reason: HOSPADM

## 2024-12-12 RX ORDER — POLYETHYLENE GLYCOL 3350 17 G/17G
17 POWDER, FOR SOLUTION ORAL DAILY PRN
Status: DISCONTINUED | OUTPATIENT
Start: 2024-12-12 | End: 2024-12-14 | Stop reason: HOSPADM

## 2024-12-12 RX ORDER — ENOXAPARIN SODIUM 100 MG/ML
40 INJECTION SUBCUTANEOUS DAILY
Status: DISCONTINUED | OUTPATIENT
Start: 2024-12-12 | End: 2024-12-14 | Stop reason: HOSPADM

## 2024-12-12 RX ORDER — ACETAMINOPHEN 325 MG/1
650 TABLET ORAL EVERY 6 HOURS PRN
Status: DISCONTINUED | OUTPATIENT
Start: 2024-12-12 | End: 2024-12-14 | Stop reason: HOSPADM

## 2024-12-12 RX ORDER — AMLODIPINE BESYLATE 10 MG/1
10 TABLET ORAL DAILY
Status: DISCONTINUED | OUTPATIENT
Start: 2024-12-12 | End: 2024-12-14 | Stop reason: HOSPADM

## 2024-12-12 RX ORDER — ACETAMINOPHEN 650 MG/1
650 SUPPOSITORY RECTAL EVERY 6 HOURS PRN
Status: DISCONTINUED | OUTPATIENT
Start: 2024-12-12 | End: 2024-12-14 | Stop reason: HOSPADM

## 2024-12-12 RX ORDER — ONDANSETRON 4 MG/1
4 TABLET, ORALLY DISINTEGRATING ORAL EVERY 8 HOURS PRN
Status: DISCONTINUED | OUTPATIENT
Start: 2024-12-12 | End: 2024-12-14 | Stop reason: HOSPADM

## 2024-12-12 RX ORDER — SODIUM CHLORIDE 9 MG/ML
INJECTION, SOLUTION INTRAVENOUS PRN
Status: DISCONTINUED | OUTPATIENT
Start: 2024-12-12 | End: 2024-12-14 | Stop reason: HOSPADM

## 2024-12-12 RX ADMIN — VANCOMYCIN 1500 MG: 1.5 INJECTION, SOLUTION INTRAVENOUS at 10:43

## 2024-12-12 RX ADMIN — SODIUM CHLORIDE 20 ML: 9 INJECTION INTRAMUSCULAR; INTRAVENOUS; SUBCUTANEOUS at 03:39

## 2024-12-12 RX ADMIN — SODIUM CHLORIDE, PRESERVATIVE FREE 10 ML: 5 INJECTION INTRAVENOUS at 21:54

## 2024-12-12 RX ADMIN — SODIUM CHLORIDE: 9 INJECTION, SOLUTION INTRAVENOUS at 03:46

## 2024-12-12 RX ADMIN — MONTELUKAST 10 MG: 10 TABLET, FILM COATED ORAL at 21:53

## 2024-12-12 RX ADMIN — AMLODIPINE BESYLATE 10 MG: 10 TABLET ORAL at 10:40

## 2024-12-12 RX ADMIN — LINEZOLID 600 MG: 600 TABLET, FILM COATED ORAL at 17:17

## 2024-12-12 RX ADMIN — SODIUM CHLORIDE 20 ML: 9 INJECTION INTRAMUSCULAR; INTRAVENOUS; SUBCUTANEOUS at 09:30

## 2024-12-12 RX ADMIN — PIPERACILLIN AND TAZOBACTAM 4500 MG: 4; .5 INJECTION, POWDER, FOR SOLUTION INTRAVENOUS at 09:30

## 2024-12-12 RX ADMIN — ENOXAPARIN SODIUM 40 MG: 100 INJECTION SUBCUTANEOUS at 10:47

## 2024-12-12 RX ADMIN — PIPERACILLIN AND TAZOBACTAM 4500 MG: 4; .5 INJECTION, POWDER, FOR SOLUTION INTRAVENOUS at 03:38

## 2024-12-12 ASSESSMENT — ENCOUNTER SYMPTOMS
ABDOMINAL DISTENTION: 0
ABDOMINAL PAIN: 0
CHEST TIGHTNESS: 0
SHORTNESS OF BREATH: 1
VOMITING: 0
NAUSEA: 0
COUGH: 1
PHOTOPHOBIA: 0
DIARRHEA: 0
BACK PAIN: 0

## 2024-12-12 ASSESSMENT — PAIN SCALES - GENERAL
PAINLEVEL_OUTOF10: 0
PAINLEVEL_OUTOF10: 8
PAINLEVEL_OUTOF10: 0

## 2024-12-12 ASSESSMENT — PAIN DESCRIPTION - LOCATION: LOCATION: CHEST

## 2024-12-12 ASSESSMENT — PAIN - FUNCTIONAL ASSESSMENT: PAIN_FUNCTIONAL_ASSESSMENT: 0-10

## 2024-12-12 NOTE — CONSULTS
[facility]  [facility]  Consultation Note     Admit Date: 12/11/2024  4:48 PM    Reason for Consult:   Possible necrotizing pneumonia    Attending Physician:  Mohinder Cruz MD    HISTORY OF PRESENT ILLNESS:             The history is obtained from extensive review of available past medical records. The patient is a 69 y.o. male who is previously known to the ID service.  The patient presented to [facility] on 12/10/2024 with chest wall pain after slipping and falling on the ice a week prior.  He reported hemoptysis.  CT of the chest showed a consolidation in the right lung with evidence of cavitation.  He was offered admission but left AMA.  He did see his pulmonologist the next day.  He was advised to go back to the ED for IV antibiotics.  On presentation he was afebrile and vital signs were normal.  White count was 16.9.  CMP was unremarkable except for slightly elevated creatinine and BUN.  Although the patient was having hemoptysis, nobody bothered to order a respiratory culture.  He was started on Zosyn and Vancomycin.  The patient does report night sweats at home.  He does not think he had a fever but did not quantify his temperatures.    Past Medical History:  September 2019.  Admitted to [facility] with pleuritic chest pain, dyspnea and night sweats for almost 2 months.  Seen by Dr. Conrad with cavitary pneumonia.  Treated with Unasyn and Vancomycin.  Underwent VATS with decortication.  Cultures grew MRSA.  He was discharged on Linezolid.        Diagnosis Date    Asthma     dr wick    Chest trauma 2019    work accident    Hypertension      Past Surgical History:        Procedure Laterality Date    THORACOSCOPY Right 9/26/2019    BRONCH, RIGHT VIDEO ASSISTED  THORACOSCOPY,  DECORTICATION, POSSIBLE THORACOTOMY performed by Danilo Armando MD at [facility] OR    TONSILLECTOMY AND ADENOIDECTOMY      WRIST SURGERY Right 1996    plate     Current Medications:   Scheduled

## 2024-12-12 NOTE — PLAN OF CARE
Problem: Infection - Adult  Goal: Absence of infection at discharge  Outcome: Not Progressing

## 2024-12-12 NOTE — H&P
[facility]   History and Physical      CHIEF COMPLAINT:  cough up blood    History of Present Illness: pt is a 69 y.o. male who presents with coughing up blood. Pt had fell last Thursday and rib cage landed onto pt's arm. Pt subsequently had rib cage pain after that which was sharp and aggravated by movement but improved without movement. Pt tried otc pain medication without relief. On Friday and Saturday, he noted couple episodes of hemoptysis with bright red blood with subsequent episodes being darker. Pt noted night sweats on Sunday, Monday, and Tuesday nights. Pt had went to ER and wanted to go home to take care of dogs. Pt went to see pulm who had pt come back to Er. Pt notes had last hemoptysis this morning. Pt denies abd pain, n/v, diarrhea, constipation, melena, hematochezia, change in urination, dysuria, or hematuria.     REVIEW OF SYSTEMS:    A detailed system review was conducted with patient and is negative unless stated in hpi.        PMH:  Past Medical History:   Diagnosis Date    Asthma     dr wick    Chest trauma 2019    work accident    Hypertension        Surgical History:  Past Surgical History:   Procedure Laterality Date    THORACOSCOPY Right 9/26/2019    BRONCH, RIGHT VIDEO ASSISTED  THORACOSCOPY,  DECORTICATION, POSSIBLE THORACOTOMY performed by Danilo Armando MD at [facility] [place]    TONSILLECTOMY AND ADENOIDECTOMY      WRIST SURGERY Right 1996    plate       Medications Prior to Admission:    Prior to Admission medications    Medication Sig Start Date End Date Taking? Authorizing Provider   Budeson-Glycopyrrol-Formoterol 160-9-4.8 MCG/ACT AERO Inhale into the lungs    Provider, MD González   clotrimazole-betamethasone (LOTRISONE) 1-0.05 % cream Apply topically 2 times daily. 8/20/22   Tim Foster III, PA   triamcinolone (KENALOG) 0.1 % cream Apply topically 2 times daily. 4/17/20   Sridhar Gudino DO   albuterol sulfate HFA (VENTOLIN HFA) 108 (90 Base)  in the abdomen/pelvis. 3. Small right inguinal hernia containing a short segment of small bowel. No evidence of obstruction. 4. Chronic appearing anterior compression deformities of T8, L1 and L2.           ASSESSMENT:      Principal Problem:    Bacterial pneumonia  Resolved Problems:    * No resolved hospital problems. *      PLAN:    Hemoptysis pulm consulted  Leukocytosis with possible necrotizing pneumonia pt given abx in ER. BC ordered   Will ask ID to see  Asthma continue singulair. Albuterol ordered  Htn continue norvasc  Myles continue iv fluids    Decision to admit          Electronically signed by Dieudonne Mayo DO on 12/11/2024 at 8:51 PM

## 2024-12-12 NOTE — ACP (ADVANCE CARE PLANNING)
Advance Care Planning   Healthcare Decision Maker:    Primary Decision Maker: Aime Quiroz - Parent - [phone]    Primary Decision Maker: Wild Quiroz - Parent - [phone]    Click here to complete Healthcare Decision Makers including selection of the Healthcare Decision Maker Relationship (ie \"Primary\").

## 2024-12-12 NOTE — PROGRESS NOTES
[facility] Hospitalist Progress Note    Admitting Date and Time: 12/11/2024  4:48 PM  Admit Dx: Bacterial pneumonia [J15.9]  SHERIDAN (acute kidney injury) (HCC) [N17.9]  Pneumonia due to infectious organism, unspecified laterality, unspecified part of lung [J18.9]    Subjective:  Patient is being followed for Bacterial pneumonia [J15.9]  SHERIDAN (acute kidney injury) (HCC) [N17.9]  Pneumonia due to infectious organism, unspecified laterality, unspecified part of lung [J18.9]     No acute events overnight.  Patient is breathing on room air and he reports improving and his hemoptysis.    ROS: denies fever, chills, cp, sob, n/v, HA unless stated above.      montelukast  10 mg Oral Nightly    amLODIPine  10 mg Oral Daily    sodium chloride flush  5-40 mL IntraVENous 2 times per day    enoxaparin  40 mg SubCUTAneous Daily    vancomycin  1,500 mg IntraVENous Q24H    piperacillin-tazobactam  4,500 mg IntraVENous Q6H    And    sodium chloride (PF)  20 mL IntraVENous Q6H     sodium chloride flush, 5-40 mL, PRN  sodium chloride, , PRN  ondansetron, 4 mg, Q8H PRN   Or  ondansetron, 4 mg, Q6H PRN  polyethylene glycol, 17 g, Daily PRN  acetaminophen, 650 mg, Q6H PRN   Or  acetaminophen, 650 mg, Q6H PRN  albuterol, 2.5 mg, Q4H PRN         Objective:    /71   Pulse 80   Temp 97.3 °F (36.3 °C) (Infrared)   Resp 18   Ht 1.676 m (5' 6\")   Wt 81.3 kg (179 lb 3.7 oz)   SpO2 92%   BMI 28.93 kg/m²     General Appearance: alert and oriented to person, place and time and in no acute distress  Skin: warm and dry  Head: normocephalic and atraumatic  Eyes: pupils equal, round, and reactive to light, extraocular eye movements intact, conjunctivae normal  Neck: neck supple and non tender without mass   Pulmonary/Chest: clear to auscultation bilaterally- no wheezes, rales or rhonchi, normal air movement, no respiratory distress  Cardiovascular: normal rate, normal S1 and S2 and no carotid bruits  Abdomen: soft, non-tender,  non-distended, normal bowel sounds, no masses or organomegaly  Extremities: no cyanosis, no clubbing and no edema  Neurologic: no cranial nerve deficit and speech normal        Recent Labs     12/11/24  0140 12/11/24  1724 12/12/24  0605    135 138   K 4.0 4.2 4.2    102 107   CO2 22 22 22   BUN 41* 33* 26*   CREATININE 1.4* 1.3* 1.4*   GLUCOSE 115* 93 97   CALCIUM 9.2 9.4 8.5*       Recent Labs     12/11/24  0140 12/11/24  1724 12/12/24  0605   WBC 16.9* 15.5* 13.4*   RBC 4.11 3.89 3.51*   HGB 11.9* 11.5* 10.4*   HCT 38.3 35.7* 32.3*   MCV 93.2 91.8 92.0   MCH 29.0 29.6 29.6   MCHC 31.1* 32.2 32.2   RDW 13.4 13.4 13.6    412 362   MPV 8.9 8.8 8.7       Radiology:   CT OF THE CHEST WITH CONTRAST; CT OF THE ABDOMEN AND PELVIS WITH CONTRAST   12/11/2024 2:14 am     IMPRESSION:  1. Redemonstration of right-sided peripheral masslike consolidation with  central areas of low attenuation.  There are associated nodular and  ground-glass opacities. Subtle ground-glass opacities are also noted to a  lesser extent in the left lung. Findings are concerning for necrotizing  pneumonia.  Underlying neoplasm is not entirely excluded.  Recommend  continued imaging follow-up to ensure resolution.  2. No evidence of metastatic disease in the abdomen/pelvis.  3. Small right inguinal hernia containing a short segment of small bowel. No  evidence of obstruction.  4. Chronic appearing anterior compression deformities of T8, L1 and L2.        Assessment:    Principal Problem:    Bacterial pneumonia  Active Problems:    Hemoptysis    SHERIDAN (acute kidney injury) (HCC)    Primary hypertension  Resolved Problems:    * No resolved hospital problems. *      Plan:    Necrotizing pneumonia  Pulmonary contusion with necrosis  History of fall and chest trauma  -Patient presents with night sweats and hemoptysis  - Check blood culture, MRSA screen and respiratory culture  -CT chest noted with right-sided peripheral masslike

## 2024-12-12 NOTE — CARE COORDINATION
Discussed in AM rounds, Pending surgery.   Social Work:    Chart reviewed. Lester admitted due to blood in his sputum, a fall last week, and chest pain. He has a history of asthma, chest trauma from a work accident, HTN, and currently being treated for pneumonia, hemoptysis. Pulmonology & ID are following. Social service met with Lester, advised him about social service role and discussed discharge planning. Lester is a patient of Dr. Brito and uses [facility]. Lester's wife passed away in April due to brain cancer and he resides along with his dog independently in a one floor home. Lester is employed as a  and has supportive parents that reside in [place] as well as brother in [place].  Lester has never used HHC or snf.  He expects to return home with no needs and will consider recommendations closer to discharge. Social service to continue to follow.    Electronically signed by JOSÉ MIGUEL Garcia on 12/12/2024 at 4:18 PM

## 2024-12-12 NOTE — PROGRESS NOTES
4 Eyes Skin Assessment     NAME:  Lester Quiroz  YOB: 1955  MEDICAL RECORD NUMBER:  [id number]    The patient is being assessed for  Admission    I agree that at least one RN has performed a thorough Head to Toe Skin Assessment on the patient. ALL assessment sites listed below have been assessed.      Areas assessed by both nurses:    Head, Face, Ears, Shoulders, Back, Chest, Arms, Elbows, Hands, Sacrum. Buttock, Coccyx, Ischium, Legs. Feet and Heels, and Under Medical Devices         Does the Patient have a Wound? No noted wound(s)       Spenser Prevention initiated by RN: No  Wound Care Orders initiated by RN: No    Pressure Injury (Stage 3,4, Unstageable, DTI, NWPT, and Complex wounds) if present, place Wound referral order by RN under : No    New Ostomies, if present place, Ostomy referral order under : No     Nurse 1 eSignature: Electronically signed by Grecia Patel RN on 12/12/24 at 4:32 AM EST    **SHARE this note so that the co-signing nurse can place an eSignature**    Nurse 2 eSignature: {Esignature:[id number]}

## 2024-12-12 NOTE — ED NOTES
.ED to Inpatient Handoff Report    Notified floor that electronic handoff available and patient ready for transport to room 416.    Safety Risks: Risk of falls    Patient in Restraints: no    Constant Observer or Patient : no    Telemetry Monitoring Ordered: Yes          Order to transfer to unit without monitor: NA    Last MEWS: 1 Time completed: 0022    Deterioration Index: 24.94    Vitals:    12/11/24 2128 12/11/24 2200 12/11/24 2206 12/12/24 0022   BP: 119/62   127/64   Pulse: 79 79 79 94   Resp: 16 22 20 20   Temp: 98 °F (36.7 °C)   98.7 °F (37.1 °C)   TempSrc: Oral   Oral   SpO2: 92% 93% 98% 94%   Weight:       Height:           Opportunity for questions and clarification was provided.

## 2024-12-12 NOTE — CONSULTS
dizziness, tremor, change in muscle strength, numbness or tingling. No change in gait, balance, coordination, mood, affect, memory, mentation, behavior.  Psychiatric: No anxiety or depression.  Endocrine: No temperature intolerance, excessive thirst, fluid intake, urinary frequency, excessive appetite, or recent weight change.   Hematologic/Lymphatic: No abnormal bruising or bleeding, blood clots or swollen lymph nodes. No anemia, fever, chills, night sweats, or swollen glands.  Allergic/Immunologic: No seasonal or perenial allergies. No history of hives or atopic dermatitis.      Social History:  Alcohol:  denies  Tobacco:   denies  Employment:  no silica or asbestos exposure  Family:  No family history of lung disease      Medications:   sodium chloride      sodium chloride 75 mL/hr at 24 0346      montelukast  10 mg Oral Nightly    amLODIPine  10 mg Oral Daily    sodium chloride flush  5-40 mL IntraVENous 2 times per day    enoxaparin  40 mg SubCUTAneous Daily    vancomycin  1,500 mg IntraVENous Q24H    piperacillin-tazobactam  4,500 mg IntraVENous Q6H    And    sodium chloride (PF)  20 mL IntraVENous Q6H       Vitals:  Tmax:  VITALS:  BP (!) 147/73   Pulse 72   Temp 97.6 °F (36.4 °C) (Temporal)   Resp 18   Ht 1.676 m (5' 6\")   Wt 81.3 kg (179 lb 3.7 oz)   SpO2 94%   BMI 28.93 kg/m²   24HR INTAKE/OUTPUT:  No intake or output data in the 24 hours ending 24 1156  CURRENT PULSE OXIMETRY:  SpO2: 94 %  24HR PULSE OXIMETRY RANGE:  SpO2  Av.5 %  Min: 89 %  Max: 98 %      EXAM:  General: No distress. Alert.  Eyes: PERRL. No sclera icterus. No conjunctival injection.  ENT: No discharge. Pharynx clear.  Neck: Trachea midline. Normal thyroid. No jvd, no hjr.   Resp: no wheezing.  No accessory muscle use. bilateral rales. no rhonchi.   CV: Regular rate. Regular rhythm. No murmur No rub.    Abd: Non-tender. Non-distended. No masses. No organmegaly. Normal bowel sounds.   Skin: Warm and dry. No nodule  examined.  History is obtained through the patient by myself.  Impression and plan are mine.    Appreciate infectious disease input.  Will monitor closely for any deterioration or further cavitation.    Documentation only provided per the nurse practitioner.    Additions and corrections are reflected in the signed note.    Eric Almazan MD,  M.D., F.C.C.P.  [facility]    [facility], [address] [place] [address], [address], [place] [address]  Office Visits:  [address] [place] [address]

## 2024-12-13 LAB
ANION GAP SERPL CALCULATED.3IONS-SCNC: 11 MMOL/L (ref 7–16)
BASOPHILS # BLD: 0.11 K/UL (ref 0–0.2)
BASOPHILS NFR BLD: 1 % (ref 0–2)
BUN SERPL-MCNC: 18 MG/DL (ref 6–23)
CALCIUM SERPL-MCNC: 9.4 MG/DL (ref 8.6–10.2)
CHLORIDE SERPL-SCNC: 107 MMOL/L (ref 98–107)
CO2 SERPL-SCNC: 22 MMOL/L (ref 22–29)
CREAT SERPL-MCNC: 1.4 MG/DL (ref 0.7–1.2)
CRP SERPL HS-MCNC: 89 MG/L (ref 0–5)
EOSINOPHIL # BLD: 0.57 K/UL (ref 0.05–0.5)
EOSINOPHILS RELATIVE PERCENT: 5 % (ref 0–6)
ERYTHROCYTE [DISTWIDTH] IN BLOOD BY AUTOMATED COUNT: 13.3 % (ref 11.5–15)
ERYTHROCYTE [SEDIMENTATION RATE] IN BLOOD BY WESTERGREN METHOD: 108 MM/HR (ref 0–15)
GFR, ESTIMATED: 54 ML/MIN/1.73M2
GLUCOSE SERPL-MCNC: 106 MG/DL (ref 74–99)
HCT VFR BLD AUTO: 38.2 % (ref 37–54)
HGB BLD-MCNC: 11.9 G/DL (ref 12.5–16.5)
IMM GRANULOCYTES # BLD AUTO: 0.3 K/UL (ref 0–0.58)
IMM GRANULOCYTES NFR BLD: 2 % (ref 0–5)
LYMPHOCYTES NFR BLD: 2.34 K/UL (ref 1.5–4)
LYMPHOCYTES RELATIVE PERCENT: 19 % (ref 20–42)
MCH RBC QN AUTO: 28.8 PG (ref 26–35)
MCHC RBC AUTO-ENTMCNC: 31.2 G/DL (ref 32–34.5)
MCV RBC AUTO: 92.5 FL (ref 80–99.9)
MICROORGANISM SPEC CULT: NORMAL
MICROORGANISM SPEC CULT: NORMAL
MICROORGANISM/AGENT SPEC: NORMAL
MONOCYTES NFR BLD: 0.75 K/UL (ref 0.1–0.95)
MONOCYTES NFR BLD: 6 % (ref 2–12)
NEUTROPHILS NFR BLD: 67 % (ref 43–80)
NEUTS SEG NFR BLD: 8.35 K/UL (ref 1.8–7.3)
PLATELET # BLD AUTO: 434 K/UL (ref 130–450)
PMV BLD AUTO: 8.9 FL (ref 7–12)
POTASSIUM SERPL-SCNC: 4.2 MMOL/L (ref 3.5–5)
RBC # BLD AUTO: 4.13 M/UL (ref 3.8–5.8)
SERVICE CMNT-IMP: NORMAL
SODIUM SERPL-SCNC: 140 MMOL/L (ref 132–146)
SPECIMEN DESCRIPTION: NORMAL
SPECIMEN DESCRIPTION: NORMAL
WBC OTHER # BLD: 12.4 K/UL (ref 4.5–11.5)

## 2024-12-13 PROCEDURE — 86140 C-REACTIVE PROTEIN: CPT

## 2024-12-13 PROCEDURE — 2580000003 HC RX 258: Performed by: INTERNAL MEDICINE

## 2024-12-13 PROCEDURE — 6370000000 HC RX 637 (ALT 250 FOR IP): Performed by: STUDENT IN AN ORGANIZED HEALTH CARE EDUCATION/TRAINING PROGRAM

## 2024-12-13 PROCEDURE — 85652 RBC SED RATE AUTOMATED: CPT

## 2024-12-13 PROCEDURE — 6370000000 HC RX 637 (ALT 250 FOR IP): Performed by: INTERNAL MEDICINE

## 2024-12-13 PROCEDURE — 6370000000 HC RX 637 (ALT 250 FOR IP): Performed by: SPECIALIST

## 2024-12-13 PROCEDURE — 85025 COMPLETE CBC W/AUTO DIFF WBC: CPT

## 2024-12-13 PROCEDURE — 80048 BASIC METABOLIC PNL TOTAL CA: CPT

## 2024-12-13 PROCEDURE — 2060000000 HC ICU INTERMEDIATE R&B

## 2024-12-13 PROCEDURE — 99232 SBSQ HOSP IP/OBS MODERATE 35: CPT | Performed by: STUDENT IN AN ORGANIZED HEALTH CARE EDUCATION/TRAINING PROGRAM

## 2024-12-13 PROCEDURE — 6360000002 HC RX W HCPCS: Performed by: INTERNAL MEDICINE

## 2024-12-13 RX ORDER — CALCIUM CARBONATE 500 MG/1
500 TABLET, CHEWABLE ORAL 3 TIMES DAILY PRN
Status: DISCONTINUED | OUTPATIENT
Start: 2024-12-13 | End: 2024-12-14 | Stop reason: HOSPADM

## 2024-12-13 RX ORDER — AMOXICILLIN AND CLAVULANATE POTASSIUM 600; 42.9 MG/5ML; MG/5ML
1800 POWDER, FOR SUSPENSION ORAL 2 TIMES DAILY
Status: DISCONTINUED | OUTPATIENT
Start: 2024-12-13 | End: 2024-12-14 | Stop reason: HOSPADM

## 2024-12-13 RX ADMIN — SODIUM CHLORIDE, PRESERVATIVE FREE 10 ML: 5 INJECTION INTRAVENOUS at 22:07

## 2024-12-13 RX ADMIN — AMOXICILLIN AND CLAVULANATE POTASSIUM 1800 MG: 600; 42.9 POWDER, FOR SUSPENSION ORAL at 18:42

## 2024-12-13 RX ADMIN — AMLODIPINE BESYLATE 10 MG: 10 TABLET ORAL at 09:11

## 2024-12-13 RX ADMIN — AMOXICILLIN AND CLAVULANATE POTASSIUM 1800 MG: 600; 42.9 POWDER, FOR SUSPENSION ORAL at 22:07

## 2024-12-13 RX ADMIN — ENOXAPARIN SODIUM 40 MG: 100 INJECTION SUBCUTANEOUS at 09:11

## 2024-12-13 RX ADMIN — MONTELUKAST 10 MG: 10 TABLET, FILM COATED ORAL at 22:04

## 2024-12-13 RX ADMIN — CALCIUM CARBONATE 500 MG: 500 TABLET, CHEWABLE ORAL at 18:42

## 2024-12-13 RX ADMIN — LINEZOLID 600 MG: 600 TABLET, FILM COATED ORAL at 05:09

## 2024-12-13 ASSESSMENT — PAIN SCALES - GENERAL: PAINLEVEL_OUTOF10: 0

## 2024-12-13 NOTE — PROGRESS NOTES
[facility]  [facility]  Progress Note    SUBJECTIVE:  Chief Complaint   Patient presents with    Hemoptysis     Patient seen last night for the same issue and left AMA this morning. Saw PCP today and was advised to come back to the ED.     The patient is feeling better.  Cough is improving.  He still has some discomfort when he takes a deep breath.  Tolerating antibiotic.    Review of systems:  As stated above in the chief complaint, otherwise negative.    Medications:  Scheduled Meds:   montelukast  10 mg Oral Nightly    amLODIPine  10 mg Oral Daily    sodium chloride flush  5-40 mL IntraVENous 2 times per day    enoxaparin  40 mg SubCUTAneous Daily    linezolid  600 mg Oral BID     Continuous Infusions:   sodium chloride       PRN Meds:sodium chloride flush, sodium chloride, ondansetron **OR** ondansetron, polyethylene glycol, acetaminophen **OR** acetaminophen, albuterol    OBJECTIVE:  /63   Pulse 81   Temp 97.3 °F (36.3 °C) (Temporal)   Resp 16   Ht 1.676 m (5' 6\")   Wt 81.7 kg (180 lb 1.9 oz)   SpO2 95%   BMI 29.07 kg/m²   Temp  Av.3 °F (36.8 °C)  Min: 97.3 °F (36.3 °C)  Max: 98.8 °F (37.1 °C)  Constitutional: The patient is awake, alert, and oriented.  Ambulating.  No distress.  Skin: Warm and dry. No rashes were noted.   HEENT: Round and reactive pupils.  Moist mucous membranes.  No ulcerations or thrush.  Neck: Supple to movements.   Chest: No use of accessory muscles to breathe. Symmetrical expansion.  Crackles heard on the right middle lung posteriorly.  Cardiovascular: S1 and S2 are rhythmic and regular. No murmurs appreciated.   Abdomen: Positive bowel sounds to auscultation. Benign to palpation.  Extremities: No edema.  Lines: Peripheral.    Laboratory and Tests:  Lab Results   Component Value Date    CRP 89.0 (H) 2024     Lab Results   Component Value Date    SEDRATE 108 (H) 2024       Radiology:      Microbiology:   Streptococcus

## 2024-12-13 NOTE — PROGRESS NOTES
Pulmonary Progress Note    Admit Date: 2024  Hospital day                               PCP: Sridhar Gudino DO    Chief Complaint (s):  Patient Active Problem List   Diagnosis    Asthma    Hemothorax    Bacterial pneumonia    Hemoptysis    SHERIDAN (acute kidney injury) (HCC)    Primary hypertension       Subjective:  Seen this p.m. with family at the bedside.  Cough is still productive of bloody mucus.  MRSA screen was negative, the patient is being converted to oral antimicrobials.      Vitals:  VITALS:  /60   Pulse 77   Temp 97.6 °F (36.4 °C) (Temporal)   Resp 16   Ht 1.676 m (5' 6\")   Wt 81.7 kg (180 lb 1.9 oz)   SpO2 96%   BMI 29.07 kg/m²     24HR INTAKE/OUTPUT:      Intake/Output Summary (Last 24 hours) at 2024 1718  Last data filed at 2024 1225  Gross per 24 hour   Intake 720 ml   Output 600 ml   Net 120 ml       24HR PULSE OXIMETRY RANGE:    SpO2  Av.8 %  Min: 92 %  Max: 96 %    Medications:  IV:   sodium chloride         Scheduled Meds:   amoxicillin-clavulanate  1,800 mg Oral BID    montelukast  10 mg Oral Nightly    amLODIPine  10 mg Oral Daily    sodium chloride flush  5-40 mL IntraVENous 2 times per day    enoxaparin  40 mg SubCUTAneous Daily       Diet:   ADULT DIET; Regular     EXAM:  General: No distress. Alert.  Eyes: PERRL. No sclera icterus. No conjunctival injection.  ENT: No discharge. Pharynx clear.  Neck: Trachea midline. Normal thyroid.  Resp: No accessory muscle use.  No rales.  No wheezing.  No rhonchi.   CV: Regular rate. Regular rhythm. No murmur or rub.   Abd: Non-tender. Non-distended. No masses. No organomegaly. Normal bowel sounds.   Skin: Warm and dry. No nodule on exposed extremities. No rash on exposed extremities.  Ext: No cyanosis, clubbing, edema  Lymph: No cervical LAD. No supraclavicular LAD.   M/S: No cyanosis. No joint deformity. No clubbing.   Neuro: Awake. Follows commands. Positive pupils/gag/corneals. Normal pain

## 2024-12-13 NOTE — PLAN OF CARE
Problem: Discharge Planning  Goal: Discharge to home or other facility with appropriate resources  12/12/2024 2245 by Grecia Patel RN  Outcome: Progressing     Problem: Pain  Goal: Verbalizes/displays adequate comfort level or baseline comfort level  12/12/2024 2245 by Grecia Patel RN  Outcome: Progressing     Problem: ABCDS Injury Assessment  Goal: Absence of physical injury  12/12/2024 2245 by Grecia Patel RN  Outcome: Progressing     Problem: Respiratory - Adult  Goal: Achieves optimal ventilation and oxygenation  12/12/2024 2245 by Grecia Patel RN  Outcome: Progressing     Problem: Cardiovascular - Adult  Goal: Maintains optimal cardiac output and hemodynamic stability  12/12/2024 2245 by Grecia Patel RN  Outcome: Progressing     Problem: Gastrointestinal - Adult  Goal: Maintains adequate nutritional intake  12/12/2024 2245 by Grecia Patel RN  Outcome: Progressing     Problem: Infection - Adult  Goal: Absence of infection at discharge  12/12/2024 2245 by Grecia Patel RN  Outcome: Progressing     Problem: Metabolic/Fluid and Electrolytes - Adult  Goal: Hemodynamic stability and optimal renal function maintained  12/12/2024 2245 by Grecia Patel RN  Outcome: Progressing     Problem: Infection - Adult  Goal: Absence of infection at discharge  12/12/2024 2245 by Grecia Patel RN  Outcome: Progressing  12/12/2024 1425 by Ale Lynch RN  Outcome: Not Progressing

## 2024-12-13 NOTE — PROGRESS NOTES
[facility] Hospitalist Progress Note    Admitting Date and Time: 12/11/2024  4:48 PM  Admit Dx: Bacterial pneumonia [J15.9]  SHERIDAN (acute kidney injury) (HCC) [N17.9]  Pneumonia due to infectious organism, unspecified laterality, unspecified part of lung [J18.9]    Subjective:  Patient is being followed for Bacterial pneumonia [J15.9]  SHERIDAN (acute kidney injury) (HCC) [N17.9]  Pneumonia due to infectious organism, unspecified laterality, unspecified part of lung [J18.9]     No acute events overnight.  Patient is breathing on room air and he reports improving in his hemoptysis.    ROS: denies fever, chills, cp, sob, n/v, HA unless stated above.      montelukast  10 mg Oral Nightly    amLODIPine  10 mg Oral Daily    sodium chloride flush  5-40 mL IntraVENous 2 times per day    enoxaparin  40 mg SubCUTAneous Daily    linezolid  600 mg Oral BID     sodium chloride flush, 5-40 mL, PRN  sodium chloride, , PRN  ondansetron, 4 mg, Q8H PRN   Or  ondansetron, 4 mg, Q6H PRN  polyethylene glycol, 17 g, Daily PRN  acetaminophen, 650 mg, Q6H PRN   Or  acetaminophen, 650 mg, Q6H PRN  albuterol, 2.5 mg, Q4H PRN         Objective:    /72   Pulse 82   Temp 98.5 °F (36.9 °C) (Infrared)   Resp 16   Ht 1.676 m (5' 6\")   Wt 81.7 kg (180 lb 1.9 oz)   SpO2 93%   BMI 29.07 kg/m²     General Appearance: alert and oriented to person, place and time and in no acute distress  Skin: warm and dry  Head: normocephalic and atraumatic  Eyes: pupils equal, round, and reactive to light, extraocular eye movements intact, conjunctivae normal  Neck: neck supple and non tender without mass   Pulmonary/Chest: clear to auscultation bilaterally- no wheezes, rales or rhonchi, normal air movement, no respiratory distress  Cardiovascular: normal rate, normal S1 and S2 and no carotid bruits  Abdomen: soft, non-tender, non-distended, normal bowel sounds, no masses or organomegaly  Extremities: no cyanosis, no clubbing and no edema  Neurologic:  linezolid    SHERIDAN  -Creatinine 1.4 on admission, baseline around 1.0  -Continue IV fluids    Chronic asthma  -Continue Singulair and bronchodilators    Chronic hypertension  -Continue amlodipine    VTE prophylaxis: Enoxaparin      NOTE: This report was transcribed using voice recognition software. Every effort was made to ensure accuracy; however, inadvertent computerized transcription errors may be present.  Electronically signed by Mohinder Cruz MD on 12/13/2024 at 9:40 AM

## 2024-12-14 ENCOUNTER — APPOINTMENT (OUTPATIENT)
Dept: CT IMAGING | Age: 69
End: 2024-12-14
Payer: MEDICARE

## 2024-12-14 VITALS
BODY MASS INDEX: 28.95 KG/M2 | RESPIRATION RATE: 18 BRPM | SYSTOLIC BLOOD PRESSURE: 114 MMHG | TEMPERATURE: 97.3 F | DIASTOLIC BLOOD PRESSURE: 74 MMHG | HEIGHT: 66 IN | HEART RATE: 83 BPM | OXYGEN SATURATION: 93 % | WEIGHT: 180.12 LBS

## 2024-12-14 LAB
ANION GAP SERPL CALCULATED.3IONS-SCNC: 10 MMOL/L (ref 7–16)
ANION GAP SERPL CALCULATED.3IONS-SCNC: 9 MMOL/L (ref 7–16)
BASOPHILS # BLD: 0.1 K/UL (ref 0–0.2)
BASOPHILS NFR BLD: 1 % (ref 0–2)
BUN SERPL-MCNC: 19 MG/DL (ref 6–23)
BUN SERPL-MCNC: 20 MG/DL (ref 6–23)
CALCIUM SERPL-MCNC: 9 MG/DL (ref 8.6–10.2)
CALCIUM SERPL-MCNC: 9.3 MG/DL (ref 8.6–10.2)
CHLORIDE SERPL-SCNC: 106 MMOL/L (ref 98–107)
CHLORIDE SERPL-SCNC: 107 MMOL/L (ref 98–107)
CO2 SERPL-SCNC: 21 MMOL/L (ref 22–29)
CO2 SERPL-SCNC: 23 MMOL/L (ref 22–29)
CREAT SERPL-MCNC: 1.3 MG/DL (ref 0.7–1.2)
CREAT SERPL-MCNC: 1.4 MG/DL (ref 0.7–1.2)
EOSINOPHIL # BLD: 0.6 K/UL (ref 0.05–0.5)
EOSINOPHILS RELATIVE PERCENT: 6 % (ref 0–6)
ERYTHROCYTE [DISTWIDTH] IN BLOOD BY AUTOMATED COUNT: 13.4 % (ref 11.5–15)
GFR, ESTIMATED: 56 ML/MIN/1.73M2
GFR, ESTIMATED: 59 ML/MIN/1.73M2
GLUCOSE SERPL-MCNC: 134 MG/DL (ref 74–99)
GLUCOSE SERPL-MCNC: 83 MG/DL (ref 74–99)
HCT VFR BLD AUTO: 35.8 % (ref 37–54)
HGB BLD-MCNC: 11.4 G/DL (ref 12.5–16.5)
IMM GRANULOCYTES # BLD AUTO: 0.23 K/UL (ref 0–0.58)
IMM GRANULOCYTES NFR BLD: 2 % (ref 0–5)
LYMPHOCYTES NFR BLD: 1.71 K/UL (ref 1.5–4)
LYMPHOCYTES RELATIVE PERCENT: 16 % (ref 20–42)
MCH RBC QN AUTO: 29.4 PG (ref 26–35)
MCHC RBC AUTO-ENTMCNC: 31.8 G/DL (ref 32–34.5)
MCV RBC AUTO: 92.3 FL (ref 80–99.9)
MONOCYTES NFR BLD: 0.71 K/UL (ref 0.1–0.95)
MONOCYTES NFR BLD: 7 % (ref 2–12)
NEUTROPHILS NFR BLD: 69 % (ref 43–80)
NEUTS SEG NFR BLD: 7.32 K/UL (ref 1.8–7.3)
PLATELET # BLD AUTO: 408 K/UL (ref 130–450)
PMV BLD AUTO: 8.8 FL (ref 7–12)
POTASSIUM SERPL-SCNC: 4 MMOL/L (ref 3.5–5)
POTASSIUM SERPL-SCNC: 4.1 MMOL/L (ref 3.5–5)
RBC # BLD AUTO: 3.88 M/UL (ref 3.8–5.8)
SODIUM SERPL-SCNC: 138 MMOL/L (ref 132–146)
SODIUM SERPL-SCNC: 138 MMOL/L (ref 132–146)
WBC OTHER # BLD: 10.7 K/UL (ref 4.5–11.5)

## 2024-12-14 PROCEDURE — 6360000002 HC RX W HCPCS: Performed by: INTERNAL MEDICINE

## 2024-12-14 PROCEDURE — 2580000003 HC RX 258: Performed by: INTERNAL MEDICINE

## 2024-12-14 PROCEDURE — 71250 CT THORAX DX C-: CPT

## 2024-12-14 PROCEDURE — 80048 BASIC METABOLIC PNL TOTAL CA: CPT

## 2024-12-14 PROCEDURE — 99239 HOSP IP/OBS DSCHRG MGMT >30: CPT | Performed by: STUDENT IN AN ORGANIZED HEALTH CARE EDUCATION/TRAINING PROGRAM

## 2024-12-14 PROCEDURE — 85025 COMPLETE CBC W/AUTO DIFF WBC: CPT

## 2024-12-14 PROCEDURE — 2580000003 HC RX 258: Performed by: STUDENT IN AN ORGANIZED HEALTH CARE EDUCATION/TRAINING PROGRAM

## 2024-12-14 PROCEDURE — 6370000000 HC RX 637 (ALT 250 FOR IP): Performed by: INTERNAL MEDICINE

## 2024-12-14 PROCEDURE — 6370000000 HC RX 637 (ALT 250 FOR IP): Performed by: STUDENT IN AN ORGANIZED HEALTH CARE EDUCATION/TRAINING PROGRAM

## 2024-12-14 RX ORDER — AMOXICILLIN AND CLAVULANATE POTASSIUM 600; 42.9 MG/5ML; MG/5ML
1800 POWDER, FOR SUSPENSION ORAL 2 TIMES DAILY
Qty: 630 ML | Refills: 0 | Status: SHIPPED | OUTPATIENT
Start: 2024-12-14 | End: 2025-01-04

## 2024-12-14 RX ORDER — 0.9 % SODIUM CHLORIDE 0.9 %
1000 INTRAVENOUS SOLUTION INTRAVENOUS ONCE
Status: COMPLETED | OUTPATIENT
Start: 2024-12-14 | End: 2024-12-14

## 2024-12-14 RX ADMIN — SODIUM CHLORIDE, PRESERVATIVE FREE 10 ML: 5 INJECTION INTRAVENOUS at 09:00

## 2024-12-14 RX ADMIN — AMOXICILLIN AND CLAVULANATE POTASSIUM 1800 MG: 600; 42.9 POWDER, FOR SUSPENSION ORAL at 09:05

## 2024-12-14 RX ADMIN — AMLODIPINE BESYLATE 10 MG: 10 TABLET ORAL at 09:05

## 2024-12-14 RX ADMIN — CALCIUM CARBONATE 500 MG: 500 TABLET, CHEWABLE ORAL at 12:51

## 2024-12-14 RX ADMIN — SODIUM CHLORIDE 1000 ML: 9 INJECTION, SOLUTION INTRAVENOUS at 09:01

## 2024-12-14 RX ADMIN — SODIUM CHLORIDE 1000 ML: 9 INJECTION, SOLUTION INTRAVENOUS at 15:45

## 2024-12-14 RX ADMIN — ENOXAPARIN SODIUM 40 MG: 100 INJECTION SUBCUTANEOUS at 09:06

## 2024-12-14 ASSESSMENT — PAIN SCALES - GENERAL
PAINLEVEL_OUTOF10: 0
PAINLEVEL_OUTOF10: 0

## 2024-12-14 NOTE — PLAN OF CARE
Problem: Discharge Planning  Goal: Discharge to home or other facility with appropriate resources  12/14/2024 0148 by Felice Sung RN  Outcome: Progressing  12/13/2024 1539 by Ale Lynch RN  Outcome: Progressing  Flowsheets (Taken 12/13/2024 0845)  Discharge to home or other facility with appropriate resources: Identify barriers to discharge with patient and caregiver     Problem: Pain  Goal: Verbalizes/displays adequate comfort level or baseline comfort level  12/14/2024 0148 by Felice Sung RN  Outcome: Progressing  12/13/2024 1539 by Ale Lynch RN  Outcome: Progressing     Problem: ABCDS Injury Assessment  Goal: Absence of physical injury  12/14/2024 0148 by Felice Sung RN  Outcome: Progressing  12/13/2024 1539 by Ale Lynch RN  Outcome: Progressing     Problem: Respiratory - Adult  Goal: Achieves optimal ventilation and oxygenation  12/14/2024 0148 by Felice Sung RN  Outcome: Progressing  12/13/2024 1539 by Ale Lynch RN  Outcome: Progressing     Problem: Cardiovascular - Adult  Goal: Maintains optimal cardiac output and hemodynamic stability  12/14/2024 0148 by Felice Sung RN  Outcome: Progressing  12/13/2024 1539 by Ale Lynch RN  Outcome: Progressing  Flowsheets (Taken 12/13/2024 0845)  Maintains optimal cardiac output and hemodynamic stability: Monitor blood pressure and heart rate     Problem: Gastrointestinal - Adult  Goal: Maintains adequate nutritional intake  12/14/2024 0148 by Felice Sung RN  Outcome: Progressing  12/13/2024 1539 by Ale Lynch RN  Outcome: Progressing     Problem: Infection - Adult  Goal: Absence of infection at discharge  12/14/2024 0148 by Felice Sung RN  Outcome: Progressing  12/13/2024 1539 by Ale Lynch RN  Outcome: Not Progressing  Flowsheets (Taken 12/13/2024 0845)  Absence of infection at discharge: Assess and monitor for signs and symptoms of infection     Problem: Metabolic/Fluid and Electrolytes - Adult  Goal: Hemodynamic stability and

## 2024-12-14 NOTE — DISCHARGE SUMMARY
low attenuation.  There are associated nodular and ground-glass opacities. Subtle ground-glass opacities are also noted to a lesser extent in the left lung.  No pneumothorax or pleural effusion. Soft Tissues/Bones: Chronic appearing anterior compression deformity of T8. No acute abnormality of the visualized osseous structures.  No focal soft tissue abnormality. Abdomen/Pelvis: Organs: The liver, gallbladder, spleen, pancreas, adrenals are within normal limits.  The right kidney is unremarkable.  There are left renal cysts. GI/Bowel:  There is no evidence of bowel obstruction.  No evidence of abnormal bowel wall thickening or distension.  The appendix is normal.  There is a small right inguinal hernia containing a short segment of small bowel. Pelvis: The urinary bladder is partially filled.  The prostate is unremarkable. Peritoneum/Retroperitoneum: No evidence of ascites or free air.  No evidence of lymphadenopathy.  Aorta is normal in caliber. Bones/Soft Tissues:  No acute abnormality of the visualized osseous structures.  There is chronic appearing anterior compression deformity L1 and L2.  No focal soft tissue abnormality.     1. Redemonstration of right-sided peripheral masslike consolidation with central areas of low attenuation.  There are associated nodular and ground-glass opacities. Subtle ground-glass opacities are also noted to a lesser extent in the left lung. Findings are concerning for necrotizing pneumonia.  Underlying neoplasm is not entirely excluded.  Recommend continued imaging follow-up to ensure resolution. 2. No evidence of metastatic disease in the abdomen/pelvis. 3. Small right inguinal hernia containing a short segment of small bowel. No evidence of obstruction. 4. Chronic appearing anterior compression deformities of T8, L1 and L2.       Patient Instructions:      Medication List        START taking these medications      amoxicillin-clavulanate 600-42.9 MG/5ML suspension  Commonly known  as: AUGMENTIN-ES  Take 15 mLs by mouth in the morning and at bedtime for 21 days            CONTINUE taking these medications      * albuterol sulfate  (90 Base) MCG/ACT inhaler  Commonly known as: ProAir HFA  Inhale 2 puffs into the lungs every 6 hours as needed for Wheezing or Shortness of Breath     * albuterol sulfate  (90 Base) MCG/ACT inhaler  Commonly known as: Ventolin HFA  Inhale 2 puffs into the lungs every 6 hours as needed for Wheezing or Shortness of Breath     amLODIPine 10 MG tablet  Commonly known as: NORVASC  Take 1 tablet by mouth daily     Budeson-Glycopyrrol-Formoterol 160-9-4.8 MCG/ACT Aero     clotrimazole-betamethasone 1-0.05 % cream  Commonly known as: Lotrisone  Apply topically 2 times daily.     montelukast 10 MG tablet  Commonly known as: Singulair  Take 1 tablet by mouth nightly     MULTIVITAMIN ADULT PO     triamcinolone 0.1 % cream  Commonly known as: KENALOG  Apply topically 2 times daily.           * This list has 2 medication(s) that are the same as other medications prescribed for you. Read the directions carefully, and ask your doctor or other care provider to review them with you.                STOP taking these medications      fluticasone-salmeterol 500-50 MCG/DOSE diskus inhaler  Commonly known as: Advair Diskus               Where to Get Your Medications        These medications were sent to [id number] - [place] [id number] [facility] - P [phone] - F [phone]  [address] [place] [address]      Phone: [phone]   amoxicillin-clavulanate 600-42.9 MG/5ML suspension           Note that more than 30 minutes was spent in preparing discharge papers, discussing discharge with patient, medication review, etc.    Signed:  Electronically signed by Eliecer Reid DO on 12/14/2024 at 5:00 PM

## 2024-12-14 NOTE — PROGRESS NOTES
[facility] Hospitalist Progress Note    Admitting Date and Time: 12/11/2024  4:48 PM  Admit Dx: Bacterial pneumonia [J15.9]  SHERIDAN (acute kidney injury) (HCC) [N17.9]  Pneumonia due to infectious organism, unspecified laterality, unspecified part of lung [J18.9]    Subjective:  Patient is being followed for Bacterial pneumonia [J15.9]  SHERIDAN (acute kidney injury) (HCC) [N17.9]  Pneumonia due to infectious organism, unspecified laterality, unspecified part of lung [J18.9]     Lester Quiroz was seen and examined sitting by window this morning. He is eager to go home. Discussed with him findings of persistent SHERIDAN as reason for fluids. Patient would still opt to go home and follow with his out patient provider however. He is awaiting his repeat CT of chest per pulm.     ROS: denies fever, chills, cp, sob, n/v, HA unless stated above.      amoxicillin-clavulanate  1,800 mg Oral BID    montelukast  10 mg Oral Nightly    amLODIPine  10 mg Oral Daily    sodium chloride flush  5-40 mL IntraVENous 2 times per day    enoxaparin  40 mg SubCUTAneous Daily     calcium carbonate, 500 mg, TID PRN  sodium chloride flush, 5-40 mL, PRN  sodium chloride, , PRN  ondansetron, 4 mg, Q8H PRN   Or  ondansetron, 4 mg, Q6H PRN  polyethylene glycol, 17 g, Daily PRN  acetaminophen, 650 mg, Q6H PRN   Or  acetaminophen, 650 mg, Q6H PRN  albuterol, 2.5 mg, Q4H PRN         Objective:    /71   Pulse 84   Temp 98.6 °F (37 °C) (Oral)   Resp 18   Ht 1.676 m (5' 6\")   Wt 81.7 kg (180 lb 1.9 oz)   SpO2 91%   BMI 29.07 kg/m²     General Appearance: alert and oriented to person, place and time and in no acute distress  Skin: warm and dry  Head: normocephalic and atraumatic  Eyes: pupils equal, round, and reactive to light, extraocular eye movements intact, conjunctivae normal  Neck: neck supple and non tender without mass   Pulmonary/Chest: clear to auscultation bilaterally- no wheezes, rales or rhonchi, normal air movement, no  Blood culture is negative, check MRSA screen and respiratory culture  -CT chest noted with right-sided peripheral masslike consolidation with central area of low-attenuation associated with nodular and groundglass opacities  -Plan per pulm to repeat CT prior to dc  -PO Augmentin BID x3 weeks per ID  -ID following, appreciate input   -Pulm following, appreciate input    SHERIDAN  -Creatinine 1.4 on admission, baseline around 1.0  -S/p 1 L IVF on 12/11  -Has not been on fluids since 12/12  -Will give 1 L NS now and recheck BMP in PM     Chronic asthma  -Continue Singulair and bronchodilators    Chronic hypertension  -Continue amlodipine    VTE prophylaxis: Enoxaparin      Overall, awaiting pulm recs. ID is OK with discharge. If pulm is OK with DC will discharge patient today with expected close OP follow up for his kidney function.     NOTE: This report was transcribed using voice recognition software. Every effort was made to ensure accuracy; however, inadvertent computerized transcription errors may be present.  Electronically signed by Eliecer Reid DO on 12/14/2024 at 8:23 AM

## 2024-12-14 NOTE — PROGRESS NOTES
Pulmonary Progress Note    Admit Date: 2024  Hospital day                               PCP: Veronica Brito DO    Chief Complaint (s):  Patient Active Problem List   Diagnosis    Asthma    Hemothorax    Bacterial pneumonia    Hemoptysis    SHERIDAN (acute kidney injury) (HCC)    Primary hypertension       Subjective:  CT scan of the chest is reviewed.  No overt changes.  Sitting up this p.m. breathing comfortably.  No pain or further hemoptysis.  Tolerating oral antibiotics      Vitals:  VITALS:  /74   Pulse 83   Temp 97.3 °F (36.3 °C) (Temporal)   Resp 18   Ht 1.676 m (5' 6\")   Wt 81.7 kg (180 lb 1.9 oz)   SpO2 93%   BMI 29.07 kg/m²     24HR INTAKE/OUTPUT:    No intake or output data in the 24 hours ending 24 1658      24HR PULSE OXIMETRY RANGE:    SpO2  Av %  Min: 91 %  Max: 96 %    Medications:  IV:   sodium chloride         Scheduled Meds:   sodium chloride  1,000 mL IntraVENous Once    amoxicillin-clavulanate  1,800 mg Oral BID    montelukast  10 mg Oral Nightly    amLODIPine  10 mg Oral Daily    sodium chloride flush  5-40 mL IntraVENous 2 times per day    enoxaparin  40 mg SubCUTAneous Daily       Diet:   ADULT DIET; Regular     EXAM:  General: No distress. Alert.  Eyes: PERRL. No sclera icterus. No conjunctival injection.  ENT: No discharge. Pharynx clear.  Neck: Trachea midline. Normal thyroid.  Resp: No accessory muscle use.  No rales.  No wheezing.  No rhonchi.   CV: Regular rate. Regular rhythm. No murmur or rub.   Abd: Non-tender. Non-distended. No masses. No organomegaly. Normal bowel sounds.   Skin: Warm and dry. No nodule on exposed extremities. No rash on exposed extremities.  Ext: No cyanosis, clubbing, edema  Lymph: No cervical LAD. No supraclavicular LAD.   M/S: No cyanosis. No joint deformity. No clubbing.   Neuro: Awake. Follows commands. Positive pupils/gag/corneals. Normal pain response.       Results:  CBC:   Recent Labs     24  0605

## 2024-12-14 NOTE — PROGRESS NOTES
[facility]  [facility]  Progress Note    SUBJECTIVE:  Chief Complaint   Patient presents with    Hemoptysis     Patient seen last night for the same issue and left AMA this morning. Saw PCP today and was advised to come back to the ED.     The patient is feeling better.  Cough is improving.  He still has some discomfort when he takes a deep breath.  Tolerating antibiotic.    Review of systems:  As stated above in the chief complaint, otherwise negative.    Medications:  Scheduled Meds:   amoxicillin-clavulanate  1,800 mg Oral BID    montelukast  10 mg Oral Nightly    amLODIPine  10 mg Oral Daily    sodium chloride flush  5-40 mL IntraVENous 2 times per day    enoxaparin  40 mg SubCUTAneous Daily     Continuous Infusions:   sodium chloride       PRN Meds:calcium carbonate, sodium chloride flush, sodium chloride, ondansetron **OR** ondansetron, polyethylene glycol, acetaminophen **OR** acetaminophen, albuterol    OBJECTIVE:  /82   Pulse 82   Temp 98.2 °F (36.8 °C) (Oral)   Resp 16   Ht 1.676 m (5' 6\")   Wt 81.7 kg (180 lb 1.9 oz)   SpO2 93%   BMI 29.07 kg/m²   Temp  Av °F (36.7 °C)  Min: 97.3 °F (36.3 °C)  Max: 98.6 °F (37 °C)  Constitutional: The patient is awake, alert, and oriented.  Ambulating.  No distress.  Skin: Warm and dry. No rashes were noted.   HEENT: Round and reactive pupils.  Moist mucous membranes.  No ulcerations or thrush.  Neck: Supple to movements.   Chest: No use of accessory muscles to breathe. Symmetrical expansion.  Crackles heard on the right middle lung posteriorly.  Cardiovascular: S1 and S2 are rhythmic and regular. No murmurs appreciated.   Abdomen: Positive bowel sounds to auscultation. Benign to palpation.  Extremities: No edema.  Lines: Peripheral.    Laboratory and Tests:  Lab Results   Component Value Date    CRP 89.0 (H) 2024     Lab Results   Component Value Date    SEDRATE 108 (H) 2024       Radiology:      Microbiology:    Streptococcus pneumoniae/Legionella urine Ag: negative  Nares screen MRSA: Negative  Respiratory culture 12/12/2024: Normal respiratory peña    Recent Labs     12/11/24  1724   PROCAL 0.11*       ASSESSMENT:  Status post fall  Necrotizing pneumonia  Hemoptysis associated to the above    PLAN:  stop Linezolid  respiratory culture shows no MRSA  Continue Augmentin ES 1800 mg p.o. twice daily x min 3 weeks   Check final cultures  CT Right effusion - pulm on case   The patient will need a minimum of 3 weeks of antibiotics on discharge  Ok to DC when ok with others        KALEB Anaya - CNS  11:49 AM  12/14/2024

## 2024-12-15 LAB
MICROORGANISM SPEC CULT: NORMAL
MICROORGANISM SPEC CULT: NORMAL
SERVICE CMNT-IMP: NORMAL
SERVICE CMNT-IMP: NORMAL
SPECIMEN DESCRIPTION: NORMAL
SPECIMEN DESCRIPTION: NORMAL

## 2025-01-11 ENCOUNTER — HOSPITAL ENCOUNTER (OUTPATIENT)
Dept: CT IMAGING | Age: 70
Discharge: HOME | End: 2025-01-13
Attending: INTERNAL MEDICINE
Payer: MEDICARE

## 2025-01-11 DIAGNOSIS — R93.89 ABNORMAL CHEST CT: ICD-10-CM

## 2025-01-11 PROCEDURE — 71250 CT THORAX DX C-: CPT

## 2025-02-15 ENCOUNTER — HOSPITAL ENCOUNTER (OUTPATIENT)
Dept: CT IMAGING | Age: 70
Discharge: HOME | End: 2025-02-17
Payer: MEDICARE

## 2025-02-15 DIAGNOSIS — J85.0 NECROTIZING PNEUMONIA (HCC): ICD-10-CM

## 2025-02-15 PROCEDURE — 71250 CT THORAX DX C-: CPT

## 2025-04-25 ENCOUNTER — HOSPITAL ENCOUNTER (OUTPATIENT)
Dept: CT IMAGING | Age: 70
Discharge: HOME | End: 2025-04-27
Attending: INTERNAL MEDICINE
Payer: COMMERCIAL

## 2025-04-25 DIAGNOSIS — J86.9 EMPYEMA (HCC): ICD-10-CM

## 2025-04-25 PROCEDURE — 71250 CT THORAX DX C-: CPT

## 2025-05-24 ENCOUNTER — HOSPITAL ENCOUNTER (OUTPATIENT)
Dept: CT IMAGING | Age: 70
Discharge: HOME | End: 2025-05-26
Payer: COMMERCIAL

## 2025-05-24 DIAGNOSIS — J85.0 NECROTIZING PNEUMONIA (HCC): ICD-10-CM

## 2025-05-24 PROCEDURE — 71250 CT THORAX DX C-: CPT

## (undated) DEVICE — STANDARD HYPODERMIC NEEDLE,ALUMINUM HUB: Brand: MONOJECT

## (undated) DEVICE — SKIN AFFIX SURG ADHESIVE 72/CS 0.55ML: Brand: MEDLINE

## (undated) DEVICE — PATIENT RETURN ELECTRODE, SINGLE-USE, CONTACT QUALITY MONITORING, ADULT, WITH 9FT CORD, FOR PATIENTS WEIGING OVER 33LBS. (15KG): Brand: MEGADYNE

## (undated) DEVICE — TROCAR: Brand: KII FIOS FIRST ENTRY

## (undated) DEVICE — KIT,ANTI FOG,W/SPONGE & FLUID,SOFT PACK: Brand: MEDLINE

## (undated) DEVICE — AGENT HEMSTAT W4XL8IN OXIDIZED REGENERATED CELOS ABSRB

## (undated) DEVICE — GLOVE ORANGE PI 7 1/2   MSG9075

## (undated) DEVICE — CLOTH SURG PREP PREOPERATIVE CHLORHEXIDINE GLUC 2% READYPREP

## (undated) DEVICE — TUBING, SUCTION, 3/16" X 12', STRAIGHT: Brand: MEDLINE

## (undated) DEVICE — LABEL MED 4 IN SURG PANEL W/ PEN STRL

## (undated) DEVICE — BLADE CLIPPER GEN PURP NS

## (undated) DEVICE — NEEDLE SPNL 20GA L3.5IN YEL HUB S STL REG WALL FIT STYL W/

## (undated) DEVICE — INTENDED FOR TISSUE SEPARATION, AND OTHER PROCEDURES THAT REQUIRE A SHARP SURGICAL BLADE TO PUNCTURE OR CUT.: Brand: BARD-PARKER ® STAINLESS STEEL BLADES

## (undated) DEVICE — GLOVE ORANGE PI 7   MSG9070

## (undated) DEVICE — CHANNEL DRAIN, 28FR, HUBLESS: Brand: JACKSON-PRATT

## (undated) DEVICE — CATHETER THOR 32FR L23IN PVC 6 EYELET STR ATRAUM

## (undated) DEVICE — CLIP INT M L GRN TI TRNSVRS GRV CHEVRON SHP W/ PRECIS TIP

## (undated) DEVICE — CLEANER,CAUTERY TIP,2X2",STERILE: Brand: MEDLINE

## (undated) DEVICE — SET INSTRUMENTS VATS THORACOSCOPY

## (undated) DEVICE — PACK,UNIV, II AURORA: Brand: MEDLINE

## (undated) DEVICE — 1.5L THIN WALL CAN: Brand: CRD

## (undated) DEVICE — CHLORAPREP 26ML ORANGE

## (undated) DEVICE — CATHETER THOR 32FR L23IN PVC 5 EYELET STR ATRAUM

## (undated) DEVICE — 3M™ MEDIPORE™ + PAD 3564: Brand: 3M™ MEDIPORE™

## (undated) DEVICE — KIT SURG W7XL11IN 2 PKT UNTREATED NA

## (undated) DEVICE — SOLUTION IV IRRIG WATER 1000ML POUR BRL 2F7114

## (undated) DEVICE — 3M™ IOBAN™ 2 ANTIMICROBIAL INCISE DRAPE 6650EZ: Brand: IOBAN™ 2

## (undated) DEVICE — TOWEL,OR,DSP,ST,BLUE,STD,6/PK,12PK/CS: Brand: MEDLINE

## (undated) DEVICE — SET CARDIAC CHEST I

## (undated) DEVICE — SOLUTION IV IRRIG POUR BRL 0.9% SODIUM CHL 2F7124

## (undated) DEVICE — CAMERA CARDIAC STRYKER 1488 HD

## (undated) DEVICE — SURGICAL PROCEDURE PACK BASIC

## (undated) DEVICE — WARMER SCP LAP

## (undated) DEVICE — SYRINGE 20ML LL S/C 50

## (undated) DEVICE — CONTAINER VACUTAINER ANAER CULTURE SWAB

## (undated) DEVICE — GRADUATE

## (undated) DEVICE — DRAIN SURG SGL COLL PT TB FOR ATS BG OASIS

## (undated) DEVICE — GLOVE SURG SZ 65 THK91MIL LTX FREE SYN POLYISOPRENE

## (undated) DEVICE — GARMENT,MEDLINE,DVT,INT,CALF,MED, GEN2: Brand: MEDLINE

## (undated) DEVICE — TRAP,MUCUS SPECIMEN,40CC: Brand: MEDLINE

## (undated) DEVICE — SYRINGE MED 50ML LUERLOCK TIP

## (undated) DEVICE — MARKER,SKIN,PREP-RESISTANT,NON-STERILE: Brand: MEDLINE

## (undated) DEVICE — GLOVE SURG SZ 7.5 L11.73IN FNGR THK9.8MIL STRW LTX POLYMER